# Patient Record
Sex: FEMALE | Race: WHITE | HISPANIC OR LATINO
[De-identification: names, ages, dates, MRNs, and addresses within clinical notes are randomized per-mention and may not be internally consistent; named-entity substitution may affect disease eponyms.]

---

## 2017-01-04 ENCOUNTER — APPOINTMENT (OUTPATIENT)
Dept: ORTHOPEDIC SURGERY | Facility: CLINIC | Age: 47
End: 2017-01-04

## 2017-02-27 ENCOUNTER — APPOINTMENT (OUTPATIENT)
Dept: MAMMOGRAPHY | Facility: CLINIC | Age: 47
End: 2017-02-27

## 2017-02-27 ENCOUNTER — APPOINTMENT (OUTPATIENT)
Dept: ULTRASOUND IMAGING | Facility: CLINIC | Age: 47
End: 2017-02-27

## 2017-02-27 ENCOUNTER — OUTPATIENT (OUTPATIENT)
Dept: OUTPATIENT SERVICES | Facility: HOSPITAL | Age: 47
LOS: 1 days | End: 2017-02-27
Payer: COMMERCIAL

## 2017-02-27 DIAGNOSIS — Z98.89 OTHER SPECIFIED POSTPROCEDURAL STATES: Chronic | ICD-10-CM

## 2017-02-27 DIAGNOSIS — Z96.7 PRESENCE OF OTHER BONE AND TENDON IMPLANTS: Chronic | ICD-10-CM

## 2017-02-27 DIAGNOSIS — S42.409A UNSPECIFIED FRACTURE OF LOWER END OF UNSPECIFIED HUMERUS, INITIAL ENCOUNTER FOR CLOSED FRACTURE: Chronic | ICD-10-CM

## 2017-02-27 DIAGNOSIS — Z00.8 ENCOUNTER FOR OTHER GENERAL EXAMINATION: ICD-10-CM

## 2017-02-27 PROCEDURE — 77067 SCR MAMMO BI INCL CAD: CPT

## 2017-02-27 PROCEDURE — 76641 ULTRASOUND BREAST COMPLETE: CPT

## 2017-02-27 PROCEDURE — 77063 BREAST TOMOSYNTHESIS BI: CPT

## 2017-03-02 DIAGNOSIS — N60.12 DIFFUSE CYSTIC MASTOPATHY OF LEFT BREAST: ICD-10-CM

## 2017-03-02 DIAGNOSIS — Z12.31 ENCOUNTER FOR SCREENING MAMMOGRAM FOR MALIGNANT NEOPLASM OF BREAST: ICD-10-CM

## 2017-03-02 DIAGNOSIS — N60.11 DIFFUSE CYSTIC MASTOPATHY OF RIGHT BREAST: ICD-10-CM

## 2017-03-02 DIAGNOSIS — N63 UNSPECIFIED LUMP IN BREAST: ICD-10-CM

## 2017-03-08 ENCOUNTER — APPOINTMENT (OUTPATIENT)
Dept: ORTHOPEDIC SURGERY | Facility: CLINIC | Age: 47
End: 2017-03-08

## 2017-09-07 ENCOUNTER — APPOINTMENT (OUTPATIENT)
Dept: ORTHOPEDIC SURGERY | Facility: CLINIC | Age: 47
End: 2017-09-07
Payer: COMMERCIAL

## 2017-09-07 PROCEDURE — 73502 X-RAY EXAM HIP UNI 2-3 VIEWS: CPT | Mod: LT

## 2017-09-07 PROCEDURE — 99204 OFFICE O/P NEW MOD 45 MIN: CPT

## 2017-10-10 ENCOUNTER — APPOINTMENT (OUTPATIENT)
Dept: ORTHOPEDIC SURGERY | Facility: CLINIC | Age: 47
End: 2017-10-10
Payer: COMMERCIAL

## 2017-10-10 DIAGNOSIS — S42.341K DISPLACED SPIRAL FRACTURE OF SHAFT OF HUMERUS, RIGHT ARM, SUBSEQUENT ENCOUNTER FOR FRACTURE WITH NONUNION: ICD-10-CM

## 2017-10-10 DIAGNOSIS — G56.31 LESION OF RADIAL NERVE, RIGHT UPPER LIMB: ICD-10-CM

## 2017-10-10 PROCEDURE — 73060 X-RAY EXAM OF HUMERUS: CPT | Mod: RT

## 2017-10-10 PROCEDURE — 99213 OFFICE O/P EST LOW 20 MIN: CPT

## 2017-10-10 RX ORDER — LEVOTHYROXINE SODIUM 150 UG/1
150 TABLET ORAL
Qty: 90 | Refills: 0 | Status: ACTIVE | COMMUNITY
Start: 2017-09-26

## 2017-11-03 ENCOUNTER — APPOINTMENT (OUTPATIENT)
Dept: ORTHOPEDIC SURGERY | Facility: CLINIC | Age: 47
End: 2017-11-03
Payer: COMMERCIAL

## 2017-11-03 VITALS — WEIGHT: 165 LBS | BODY MASS INDEX: 27.49 KG/M2 | HEIGHT: 65 IN

## 2017-11-03 PROCEDURE — 99214 OFFICE O/P EST MOD 30 MIN: CPT

## 2017-11-09 ENCOUNTER — APPOINTMENT (OUTPATIENT)
Dept: MRI IMAGING | Facility: CLINIC | Age: 47
End: 2017-11-09
Payer: COMMERCIAL

## 2017-11-09 ENCOUNTER — OUTPATIENT (OUTPATIENT)
Dept: OUTPATIENT SERVICES | Facility: HOSPITAL | Age: 47
LOS: 1 days | End: 2017-11-09
Payer: COMMERCIAL

## 2017-11-09 DIAGNOSIS — Z98.89 OTHER SPECIFIED POSTPROCEDURAL STATES: Chronic | ICD-10-CM

## 2017-11-09 DIAGNOSIS — Z96.7 PRESENCE OF OTHER BONE AND TENDON IMPLANTS: Chronic | ICD-10-CM

## 2017-11-09 DIAGNOSIS — S42.409A UNSPECIFIED FRACTURE OF LOWER END OF UNSPECIFIED HUMERUS, INITIAL ENCOUNTER FOR CLOSED FRACTURE: Chronic | ICD-10-CM

## 2017-11-09 DIAGNOSIS — M16.12 UNILATERAL PRIMARY OSTEOARTHRITIS, LEFT HIP: ICD-10-CM

## 2017-11-09 PROCEDURE — 73721 MRI JNT OF LWR EXTRE W/O DYE: CPT | Mod: 26,RT

## 2017-11-09 PROCEDURE — 73721 MRI JNT OF LWR EXTRE W/O DYE: CPT | Mod: 26,LT

## 2017-11-09 PROCEDURE — 73721 MRI JNT OF LWR EXTRE W/O DYE: CPT

## 2017-12-07 ENCOUNTER — APPOINTMENT (OUTPATIENT)
Dept: ORTHOPEDIC SURGERY | Facility: CLINIC | Age: 47
End: 2017-12-07
Payer: COMMERCIAL

## 2017-12-07 VITALS — WEIGHT: 165 LBS | HEIGHT: 65 IN | BODY MASS INDEX: 27.49 KG/M2

## 2017-12-07 DIAGNOSIS — M16.12 UNILATERAL PRIMARY OSTEOARTHRITIS, LEFT HIP: ICD-10-CM

## 2017-12-07 PROCEDURE — 99215 OFFICE O/P EST HI 40 MIN: CPT

## 2018-01-02 ENCOUNTER — OUTPATIENT (OUTPATIENT)
Dept: OUTPATIENT SERVICES | Facility: HOSPITAL | Age: 48
LOS: 1 days | End: 2018-01-02
Payer: COMMERCIAL

## 2018-01-02 VITALS
SYSTOLIC BLOOD PRESSURE: 121 MMHG | HEIGHT: 65.5 IN | OXYGEN SATURATION: 99 % | TEMPERATURE: 98 F | RESPIRATION RATE: 16 BRPM | HEART RATE: 70 BPM | DIASTOLIC BLOOD PRESSURE: 81 MMHG | WEIGHT: 145.95 LBS

## 2018-01-02 DIAGNOSIS — Z01.818 ENCOUNTER FOR OTHER PREPROCEDURAL EXAMINATION: ICD-10-CM

## 2018-01-02 DIAGNOSIS — M16.12 UNILATERAL PRIMARY OSTEOARTHRITIS, LEFT HIP: ICD-10-CM

## 2018-01-02 DIAGNOSIS — Z98.89 OTHER SPECIFIED POSTPROCEDURAL STATES: Chronic | ICD-10-CM

## 2018-01-02 DIAGNOSIS — Z96.7 PRESENCE OF OTHER BONE AND TENDON IMPLANTS: Chronic | ICD-10-CM

## 2018-01-02 DIAGNOSIS — S42.402A UNSPECIFIED FRACTURE OF LOWER END OF LEFT HUMERUS, INITIAL ENCOUNTER FOR CLOSED FRACTURE: Chronic | ICD-10-CM

## 2018-01-02 DIAGNOSIS — S42.409A UNSPECIFIED FRACTURE OF LOWER END OF UNSPECIFIED HUMERUS, INITIAL ENCOUNTER FOR CLOSED FRACTURE: Chronic | ICD-10-CM

## 2018-01-02 LAB
BLD GP AB SCN SERPL QL: NEGATIVE — SIGNIFICANT CHANGE UP
HCT VFR BLD CALC: 37.7 % — SIGNIFICANT CHANGE UP (ref 34.5–45)
HGB BLD-MCNC: 12.2 G/DL — SIGNIFICANT CHANGE UP (ref 11.5–15.5)
MCHC RBC-ENTMCNC: 28.9 PG — SIGNIFICANT CHANGE UP (ref 27–34)
MCHC RBC-ENTMCNC: 32.4 GM/DL — SIGNIFICANT CHANGE UP (ref 32–36)
MCV RBC AUTO: 89.3 FL — SIGNIFICANT CHANGE UP (ref 80–100)
PLATELET # BLD AUTO: 261 K/UL — SIGNIFICANT CHANGE UP (ref 150–400)
RBC # BLD: 4.22 M/UL — SIGNIFICANT CHANGE UP (ref 3.8–5.2)
RBC # FLD: 13.5 % — SIGNIFICANT CHANGE UP (ref 10.3–14.5)
RH IG SCN BLD-IMP: POSITIVE — SIGNIFICANT CHANGE UP
WBC # BLD: 5.79 K/UL — SIGNIFICANT CHANGE UP (ref 3.8–10.5)
WBC # FLD AUTO: 5.79 K/UL — SIGNIFICANT CHANGE UP (ref 3.8–10.5)

## 2018-01-02 PROCEDURE — 87641 MR-STAPH DNA AMP PROBE: CPT

## 2018-01-02 PROCEDURE — 86901 BLOOD TYPING SEROLOGIC RH(D): CPT

## 2018-01-02 PROCEDURE — 86900 BLOOD TYPING SEROLOGIC ABO: CPT

## 2018-01-02 PROCEDURE — 86850 RBC ANTIBODY SCREEN: CPT

## 2018-01-02 PROCEDURE — 87640 STAPH A DNA AMP PROBE: CPT

## 2018-01-02 PROCEDURE — 85027 COMPLETE CBC AUTOMATED: CPT

## 2018-01-02 PROCEDURE — G0463: CPT

## 2018-01-02 RX ORDER — TRAMADOL HYDROCHLORIDE 50 MG/1
50 TABLET ORAL ONCE
Qty: 0 | Refills: 0 | Status: DISCONTINUED | OUTPATIENT
Start: 2018-01-09 | End: 2018-01-09

## 2018-01-02 RX ORDER — GABAPENTIN 400 MG/1
300 CAPSULE ORAL ONCE
Qty: 0 | Refills: 0 | Status: COMPLETED | OUTPATIENT
Start: 2018-01-09 | End: 2018-01-09

## 2018-01-02 RX ORDER — ACETAMINOPHEN 500 MG
975 TABLET ORAL ONCE
Qty: 0 | Refills: 0 | Status: COMPLETED | OUTPATIENT
Start: 2018-01-09 | End: 2018-01-09

## 2018-01-02 RX ORDER — PANTOPRAZOLE SODIUM 20 MG/1
40 TABLET, DELAYED RELEASE ORAL ONCE
Qty: 0 | Refills: 0 | Status: COMPLETED | OUTPATIENT
Start: 2018-01-09 | End: 2018-01-09

## 2018-01-02 RX ORDER — CEFAZOLIN SODIUM 1 G
2000 VIAL (EA) INJECTION ONCE
Qty: 0 | Refills: 0 | Status: DISCONTINUED | OUTPATIENT
Start: 2018-01-09 | End: 2018-01-10

## 2018-01-02 NOTE — H&P PST ADULT - PSH
Fracture of elbow  left repair   S/P     S/P ORIF (open reduction internal fixation) fracture  right humerus 2016 Elbow fracture, left  s/p pins   S/P     S/P ORIF (open reduction internal fixation) fracture  right humerus 2016 s/p pin removal 2016

## 2018-01-02 NOTE — H&P PST ADULT - NSANTHOSAYNRD_GEN_A_CORE
No. PENNY screening performed.  STOP BANG Legend: 0-2 = LOW Risk; 3-4 = INTERMEDIATE Risk; 5-8 = HIGH Risk

## 2018-01-02 NOTE — H&P PST ADULT - PMH
Closed nondisplaced spiral fracture of shaft of humerus    Dry eye syndrome    Hypothyroidism    Seasonal allergies Closed nondisplaced spiral fracture of shaft of humerus  s/p repair x2  Hypothyroidism  hx of goiter s/p Iodine treatment 2014  Primary osteoarthritis of left hip    Seasonal allergies

## 2018-01-02 NOTE — H&P PST ADULT - PRIMARY CARE PROVIDER
Dr. Sharon Harrington (pcp) [phone], fax [phone] Dr. Sharon Harrington ([facility]) [phone]

## 2018-01-02 NOTE — H&P PST ADULT - RS GEN PE MLT RESP DETAILS PC
no rhonchi/no wheezes/breath sounds equal/good air movement/no rales/respirations non-labored/clear to auscultation bilaterally/no chest wall tenderness

## 2018-01-02 NOTE — H&P PST ADULT - HISTORY OF PRESENT ILLNESS
45 y/o female with hx of hypothyroidism, right humerus fracture, s/p ORIF 8/2016 presents c/o right radial nerve palsy since the surgery in August. Patient states, s/p EMG x 2 which showed, no progression of return of her radial nerve symptoms. Patient is scheduled for neurolysis right radial neck removal of hardware right arm on 11/11/2016. 47 year old female with PMH of hypothyroidism with compliant of left hip osteoarthritis pain planned for left anterior total hip replacement.

## 2018-01-02 NOTE — H&P PST ADULT - GASTROINTESTINAL DETAILS
no guarding/no bruit/no rebound tenderness/soft/nontender/no masses palpable/bowel sounds normal/no distention no rebound tenderness/bowel sounds normal/soft/nontender/no masses palpable/no distention

## 2018-01-02 NOTE — H&P PST ADULT - NEUROLOGICAL DETAILS
preop dx lesion of radial nerve, unspecified upper limb/responds to verbal commands/alert and oriented x 3 alert and oriented x 3/no spontaneous movement

## 2018-01-02 NOTE — H&P PST ADULT - NEGATIVE GASTROINTESTINAL SYMPTOMS
no nausea/no diarrhea/no hematochezia/no constipation/no abdominal pain/no melena/no vomiting no melena/no abdominal pain/no hematochezia/no vomiting/no nausea

## 2018-01-02 NOTE — H&P PST ADULT - FAMILY HISTORY
Mother  Still living? Yes, Estimated age: Age Unknown  Family history of diabetes mellitus, Age at diagnosis: Age Unknown  Family history of hypertension, Age at diagnosis: Age Unknown  Family history of hypothyroidism, Age at diagnosis: Age Unknown  Family history of hypercholesterolemia, Age at diagnosis: Age Unknown     Father  Still living? Yes, Estimated age: Age Unknown  Family history of glaucoma, Age at diagnosis: Age Unknown  Family history of renal cancer, Age at diagnosis: Age Unknown

## 2018-01-02 NOTE — H&P PST ADULT - PROBLEM SELECTOR PLAN 1
planned for left anterior total hip replacement.   PST labs, MRSA send  Preprocedure surgical scrub instructions discussed

## 2018-01-03 LAB
MRSA PCR RESULT.: SIGNIFICANT CHANGE UP
S AUREUS DNA NOSE QL NAA+PROBE: SIGNIFICANT CHANGE UP

## 2018-01-09 ENCOUNTER — RESULT REVIEW (OUTPATIENT)
Age: 48
End: 2018-01-09

## 2018-01-09 ENCOUNTER — INPATIENT (INPATIENT)
Facility: HOSPITAL | Age: 48
LOS: 0 days | Discharge: HOME | DRG: 470 | End: 2018-01-10
Attending: ORTHOPAEDIC SURGERY | Admitting: ORTHOPAEDIC SURGERY
Payer: COMMERCIAL

## 2018-01-09 ENCOUNTER — APPOINTMENT (OUTPATIENT)
Dept: ORTHOPEDIC SURGERY | Facility: HOSPITAL | Age: 48
End: 2018-01-09

## 2018-01-09 VITALS
WEIGHT: 145.95 LBS | TEMPERATURE: 98 F | OXYGEN SATURATION: 99 % | RESPIRATION RATE: 18 BRPM | HEART RATE: 67 BPM | HEIGHT: 65.5 IN | DIASTOLIC BLOOD PRESSURE: 72 MMHG | SYSTOLIC BLOOD PRESSURE: 108 MMHG

## 2018-01-09 DIAGNOSIS — M16.12 UNILATERAL PRIMARY OSTEOARTHRITIS, LEFT HIP: ICD-10-CM

## 2018-01-09 DIAGNOSIS — Z96.7 PRESENCE OF OTHER BONE AND TENDON IMPLANTS: Chronic | ICD-10-CM

## 2018-01-09 DIAGNOSIS — Z98.89 OTHER SPECIFIED POSTPROCEDURAL STATES: Chronic | ICD-10-CM

## 2018-01-09 DIAGNOSIS — S42.402A UNSPECIFIED FRACTURE OF LOWER END OF LEFT HUMERUS, INITIAL ENCOUNTER FOR CLOSED FRACTURE: Chronic | ICD-10-CM

## 2018-01-09 LAB
ANION GAP SERPL CALC-SCNC: 10 MMOL/L — SIGNIFICANT CHANGE UP (ref 5–17)
BUN SERPL-MCNC: 10 MG/DL — SIGNIFICANT CHANGE UP (ref 7–23)
CALCIUM SERPL-MCNC: 8.9 MG/DL — SIGNIFICANT CHANGE UP (ref 8.4–10.5)
CHLORIDE SERPL-SCNC: 102 MMOL/L — SIGNIFICANT CHANGE UP (ref 96–108)
CO2 SERPL-SCNC: 29 MMOL/L — SIGNIFICANT CHANGE UP (ref 22–31)
CREAT SERPL-MCNC: 0.79 MG/DL — SIGNIFICANT CHANGE UP (ref 0.5–1.3)
GLUCOSE SERPL-MCNC: 116 MG/DL — HIGH (ref 70–99)
HCG UR QL: NEGATIVE — SIGNIFICANT CHANGE UP
HCT VFR BLD CALC: 36.3 % — SIGNIFICANT CHANGE UP (ref 34.5–45)
HGB BLD-MCNC: 12.3 G/DL — SIGNIFICANT CHANGE UP (ref 11.5–15.5)
MCHC RBC-ENTMCNC: 31.2 PG — SIGNIFICANT CHANGE UP (ref 27–34)
MCHC RBC-ENTMCNC: 33.9 GM/DL — SIGNIFICANT CHANGE UP (ref 32–36)
MCV RBC AUTO: 91.9 FL — SIGNIFICANT CHANGE UP (ref 80–100)
PLATELET # BLD AUTO: 225 K/UL — SIGNIFICANT CHANGE UP (ref 150–400)
POTASSIUM SERPL-MCNC: 4.4 MMOL/L — SIGNIFICANT CHANGE UP (ref 3.5–5.3)
POTASSIUM SERPL-SCNC: 4.4 MMOL/L — SIGNIFICANT CHANGE UP (ref 3.5–5.3)
RBC # BLD: 3.95 M/UL — SIGNIFICANT CHANGE UP (ref 3.8–5.2)
RBC # FLD: 11.5 % — SIGNIFICANT CHANGE UP (ref 10.3–14.5)
RH IG SCN BLD-IMP: POSITIVE — SIGNIFICANT CHANGE UP
SODIUM SERPL-SCNC: 141 MMOL/L — SIGNIFICANT CHANGE UP (ref 135–145)
WBC # BLD: 7 K/UL — SIGNIFICANT CHANGE UP (ref 3.8–10.5)
WBC # FLD AUTO: 7 K/UL — SIGNIFICANT CHANGE UP (ref 3.8–10.5)

## 2018-01-09 PROCEDURE — 27130 TOTAL HIP ARTHROPLASTY: CPT | Mod: LT

## 2018-01-09 PROCEDURE — 88305 TISSUE EXAM BY PATHOLOGIST: CPT | Mod: 26

## 2018-01-09 PROCEDURE — 88311 DECALCIFY TISSUE: CPT | Mod: 26

## 2018-01-09 PROCEDURE — 72170 X-RAY EXAM OF PELVIS: CPT | Mod: 26

## 2018-01-09 RX ORDER — OXYCODONE HYDROCHLORIDE 5 MG/1
10 TABLET ORAL EVERY 4 HOURS
Qty: 0 | Refills: 0 | Status: DISCONTINUED | OUTPATIENT
Start: 2018-01-09 | End: 2018-01-10

## 2018-01-09 RX ORDER — KETOROLAC TROMETHAMINE 30 MG/ML
15 SYRINGE (ML) INJECTION EVERY 8 HOURS
Qty: 0 | Refills: 0 | Status: DISCONTINUED | OUTPATIENT
Start: 2018-01-09 | End: 2018-01-10

## 2018-01-09 RX ORDER — INFLUENZA VIRUS VACCINE 15; 15; 15; 15 UG/.5ML; UG/.5ML; UG/.5ML; UG/.5ML
0.5 SUSPENSION INTRAMUSCULAR ONCE
Qty: 0 | Refills: 0 | Status: DISCONTINUED | OUTPATIENT
Start: 2018-01-09 | End: 2018-01-10

## 2018-01-09 RX ORDER — SODIUM CHLORIDE 9 MG/ML
3 INJECTION INTRAMUSCULAR; INTRAVENOUS; SUBCUTANEOUS EVERY 8 HOURS
Qty: 0 | Refills: 0 | Status: DISCONTINUED | OUTPATIENT
Start: 2018-01-09 | End: 2018-01-09

## 2018-01-09 RX ORDER — CEFAZOLIN SODIUM 1 G
2000 VIAL (EA) INJECTION EVERY 8 HOURS
Qty: 0 | Refills: 0 | Status: COMPLETED | OUTPATIENT
Start: 2018-01-09 | End: 2018-01-10

## 2018-01-09 RX ORDER — LEVOTHYROXINE SODIUM 125 MCG
150 TABLET ORAL DAILY
Qty: 0 | Refills: 0 | Status: DISCONTINUED | OUTPATIENT
Start: 2018-01-09 | End: 2018-01-10

## 2018-01-09 RX ORDER — ONDANSETRON 8 MG/1
4 TABLET, FILM COATED ORAL EVERY 6 HOURS
Qty: 0 | Refills: 0 | Status: DISCONTINUED | OUTPATIENT
Start: 2018-01-09 | End: 2018-01-10

## 2018-01-09 RX ORDER — DOCUSATE SODIUM 100 MG
100 CAPSULE ORAL
Qty: 0 | Refills: 0 | Status: DISCONTINUED | OUTPATIENT
Start: 2018-01-09 | End: 2018-01-10

## 2018-01-09 RX ORDER — ACETAMINOPHEN 500 MG
650 TABLET ORAL EVERY 6 HOURS
Qty: 0 | Refills: 0 | Status: COMPLETED | OUTPATIENT
Start: 2018-01-09 | End: 2018-01-10

## 2018-01-09 RX ORDER — ASPIRIN/CALCIUM CARB/MAGNESIUM 324 MG
325 TABLET ORAL
Qty: 0 | Refills: 0 | Status: DISCONTINUED | OUTPATIENT
Start: 2018-01-10 | End: 2018-01-10

## 2018-01-09 RX ORDER — HYDROMORPHONE HYDROCHLORIDE 2 MG/ML
0.5 INJECTION INTRAMUSCULAR; INTRAVENOUS; SUBCUTANEOUS
Qty: 0 | Refills: 0 | Status: DISCONTINUED | OUTPATIENT
Start: 2018-01-09 | End: 2018-01-09

## 2018-01-09 RX ORDER — DOCUSATE SODIUM 100 MG
1 CAPSULE ORAL
Qty: 0 | Refills: 0 | COMMUNITY

## 2018-01-09 RX ORDER — OXYCODONE HYDROCHLORIDE 5 MG/1
5 TABLET ORAL EVERY 4 HOURS
Qty: 0 | Refills: 0 | Status: DISCONTINUED | OUTPATIENT
Start: 2018-01-09 | End: 2018-01-10

## 2018-01-09 RX ORDER — SODIUM CHLORIDE 9 MG/ML
1000 INJECTION, SOLUTION INTRAVENOUS
Qty: 0 | Refills: 0 | Status: DISCONTINUED | OUTPATIENT
Start: 2018-01-09 | End: 2018-01-10

## 2018-01-09 RX ORDER — SODIUM CHLORIDE 9 MG/ML
1000 INJECTION INTRAMUSCULAR; INTRAVENOUS; SUBCUTANEOUS ONCE
Qty: 0 | Refills: 0 | Status: COMPLETED | OUTPATIENT
Start: 2018-01-09 | End: 2018-01-09

## 2018-01-09 RX ORDER — MAGNESIUM HYDROXIDE 400 MG/1
30 TABLET, CHEWABLE ORAL DAILY
Qty: 0 | Refills: 0 | Status: DISCONTINUED | OUTPATIENT
Start: 2018-01-09 | End: 2018-01-10

## 2018-01-09 RX ORDER — HYDROMORPHONE HYDROCHLORIDE 2 MG/ML
0.5 INJECTION INTRAMUSCULAR; INTRAVENOUS; SUBCUTANEOUS EVERY 4 HOURS
Qty: 0 | Refills: 0 | Status: DISCONTINUED | OUTPATIENT
Start: 2018-01-09 | End: 2018-01-10

## 2018-01-09 RX ORDER — POLYETHYLENE GLYCOL 3350 17 G/17G
17 POWDER, FOR SOLUTION ORAL DAILY
Qty: 0 | Refills: 0 | Status: DISCONTINUED | OUTPATIENT
Start: 2018-01-09 | End: 2018-01-10

## 2018-01-09 RX ORDER — LIDOCAINE HCL 20 MG/ML
0.2 VIAL (ML) INJECTION ONCE
Qty: 0 | Refills: 0 | Status: DISCONTINUED | OUTPATIENT
Start: 2018-01-09 | End: 2018-01-09

## 2018-01-09 RX ORDER — SENNA PLUS 8.6 MG/1
2 TABLET ORAL AT BEDTIME
Qty: 0 | Refills: 0 | Status: DISCONTINUED | OUTPATIENT
Start: 2018-01-09 | End: 2018-01-10

## 2018-01-09 RX ADMIN — Medication 15 MILLIGRAM(S): at 21:28

## 2018-01-09 RX ADMIN — Medication 100 MILLIGRAM(S): at 20:57

## 2018-01-09 RX ADMIN — Medication 100 MILLIGRAM(S): at 17:50

## 2018-01-09 RX ADMIN — SODIUM CHLORIDE 1000 MILLILITER(S): 9 INJECTION INTRAMUSCULAR; INTRAVENOUS; SUBCUTANEOUS at 18:49

## 2018-01-09 RX ADMIN — Medication 650 MILLIGRAM(S): at 17:50

## 2018-01-09 RX ADMIN — PANTOPRAZOLE SODIUM 40 MILLIGRAM(S): 20 TABLET, DELAYED RELEASE ORAL at 11:00

## 2018-01-09 RX ADMIN — GABAPENTIN 300 MILLIGRAM(S): 400 CAPSULE ORAL at 11:00

## 2018-01-09 RX ADMIN — Medication 15 MILLIGRAM(S): at 20:58

## 2018-01-09 RX ADMIN — TRAMADOL HYDROCHLORIDE 50 MILLIGRAM(S): 50 TABLET ORAL at 11:19

## 2018-01-09 RX ADMIN — Medication 975 MILLIGRAM(S): at 11:00

## 2018-01-09 NOTE — PHYSICAL THERAPY INITIAL EVALUATION ADULT - ACTIVE RANGE OF MOTION EXAMINATION, REHAB EVAL
bilateral upper extremity Active ROM was WFL (within functional limits)/Right LE Active ROM was WFL (within functional limits)/left hip limitations s/p THR

## 2018-01-09 NOTE — PATIENT PROFILE ADULT. - PMH
Closed nondisplaced spiral fracture of shaft of humerus  s/p repair x2  Hypothyroidism  hx of goiter s/p Iodine treatment 2014  Primary osteoarthritis of left hip    Seasonal allergies

## 2018-01-09 NOTE — PHYSICAL THERAPY INITIAL EVALUATION ADULT - MANUAL MUSCLE TESTING RESULTS, REHAB EVAL
Left hip not assessed; good quad set/glut set; 5/5 at knee and ankle; bilateral UE 5/5; RLE 5/5/grossly assessed due to

## 2018-01-09 NOTE — PATIENT PROFILE ADULT. - PSH
Elbow fracture, left  s/p pins   S/P     S/P ORIF (open reduction internal fixation) fracture  right humerus 2016 s/p pin removal 2016

## 2018-01-09 NOTE — PHYSICAL THERAPY INITIAL EVALUATION ADULT - PERTINENT HX OF CURRENT PROBLEM, REHAB EVAL
47 year old female with PMH of hypothyroidism w/ compliant of left hip osteoarthritis pain planned for left anterior total hip replacement, underwent as scheduled on 1/9/17.

## 2018-01-09 NOTE — CHART NOTE - NSCHARTNOTEFT_GEN_A_CORE
Patient is a 47y old  Female who presents with a chief complaint of left hip replacement (09 Jan 2018 11:08)    Resting without complaints.  No Chest Pain, SOB, N/V.    T(C): 36.2 (01-09-18 @ 16:00), Max: 36.5 (01-09-18 @ 11:05)  HR: 78 (01-09-18 @ 16:30) (67 - 92)  BP: 115/66 (01-09-18 @ 16:30) (104/55 - 146/88)  RR: 16 (01-09-18 @ 16:30) (15 - 18)  SpO2: 97% (01-09-18 @ 16:30) (86% - 99%)    Exam:   Alert and Oriented; No Acute Distress  LLE: Aquacell dressing C/D/I. Sensation grossly intact to light touch.  Calves soft, non-tender bilaterally  (+) PF/DF  (+) Distal pulses    Xray: Good alignment of hardware                          12.3   7.0   )-----------( 225      ( 09 Jan 2018 16:19 )             36.3    01-09    141  |  102  |  10  ----------------------------<  116<H>  4.4   |  29  |  0.79    Ca    8.9      09 Jan 2018 16:19    Patient is a 47y old  Female who presents with a chief complaint of left hip replacement (09 Jan 2018 11:08)   s/p Left RUPA (AA). Pt stable    Plan:  - Pain Control   - DVT ppx  - WBAT LLE/OOB/anterior precautions  - Venodynes/IS  - Continue current tx    Gualberto Henderson PA-C  Orthopedic Surgery  [phone]  [phone]

## 2018-01-10 ENCOUNTER — TRANSCRIPTION ENCOUNTER (OUTPATIENT)
Age: 48
End: 2018-01-10

## 2018-01-10 VITALS — OXYGEN SATURATION: 99 % | RESPIRATION RATE: 18 BRPM | TEMPERATURE: 99 F

## 2018-01-10 LAB
ANION GAP SERPL CALC-SCNC: 10 MMOL/L — SIGNIFICANT CHANGE UP (ref 5–17)
BUN SERPL-MCNC: 12 MG/DL — SIGNIFICANT CHANGE UP (ref 7–23)
CALCIUM SERPL-MCNC: 8.7 MG/DL — SIGNIFICANT CHANGE UP (ref 8.4–10.5)
CHLORIDE SERPL-SCNC: 100 MMOL/L — SIGNIFICANT CHANGE UP (ref 96–108)
CO2 SERPL-SCNC: 27 MMOL/L — SIGNIFICANT CHANGE UP (ref 22–31)
CREAT SERPL-MCNC: 0.81 MG/DL — SIGNIFICANT CHANGE UP (ref 0.5–1.3)
GLUCOSE SERPL-MCNC: 98 MG/DL — SIGNIFICANT CHANGE UP (ref 70–99)
HCT VFR BLD CALC: 31.2 % — LOW (ref 34.5–45)
HGB BLD-MCNC: 10.4 G/DL — LOW (ref 11.5–15.5)
MCHC RBC-ENTMCNC: 30.5 PG — SIGNIFICANT CHANGE UP (ref 27–34)
MCHC RBC-ENTMCNC: 33.2 GM/DL — SIGNIFICANT CHANGE UP (ref 32–36)
MCV RBC AUTO: 91.7 FL — SIGNIFICANT CHANGE UP (ref 80–100)
PLATELET # BLD AUTO: 216 K/UL — SIGNIFICANT CHANGE UP (ref 150–400)
POTASSIUM SERPL-MCNC: 4.2 MMOL/L — SIGNIFICANT CHANGE UP (ref 3.5–5.3)
POTASSIUM SERPL-SCNC: 4.2 MMOL/L — SIGNIFICANT CHANGE UP (ref 3.5–5.3)
RBC # BLD: 3.4 M/UL — LOW (ref 3.8–5.2)
RBC # FLD: 11.4 % — SIGNIFICANT CHANGE UP (ref 10.3–14.5)
SODIUM SERPL-SCNC: 137 MMOL/L — SIGNIFICANT CHANGE UP (ref 135–145)
WBC # BLD: 8.5 K/UL — SIGNIFICANT CHANGE UP (ref 3.8–10.5)
WBC # FLD AUTO: 8.5 K/UL — SIGNIFICANT CHANGE UP (ref 3.8–10.5)

## 2018-01-10 PROCEDURE — C1713: CPT

## 2018-01-10 PROCEDURE — 97116 GAIT TRAINING THERAPY: CPT

## 2018-01-10 PROCEDURE — 88305 TISSUE EXAM BY PATHOLOGIST: CPT

## 2018-01-10 PROCEDURE — 80048 BASIC METABOLIC PNL TOTAL CA: CPT

## 2018-01-10 PROCEDURE — 97165 OT EVAL LOW COMPLEX 30 MIN: CPT

## 2018-01-10 PROCEDURE — C1889: CPT

## 2018-01-10 PROCEDURE — 97530 THERAPEUTIC ACTIVITIES: CPT

## 2018-01-10 PROCEDURE — C1776: CPT

## 2018-01-10 PROCEDURE — 86901 BLOOD TYPING SEROLOGIC RH(D): CPT

## 2018-01-10 PROCEDURE — 72170 X-RAY EXAM OF PELVIS: CPT

## 2018-01-10 PROCEDURE — 81025 URINE PREGNANCY TEST: CPT

## 2018-01-10 PROCEDURE — 85027 COMPLETE CBC AUTOMATED: CPT

## 2018-01-10 PROCEDURE — 86900 BLOOD TYPING SEROLOGIC ABO: CPT

## 2018-01-10 PROCEDURE — 88311 DECALCIFY TISSUE: CPT

## 2018-01-10 PROCEDURE — 97161 PT EVAL LOW COMPLEX 20 MIN: CPT

## 2018-01-10 PROCEDURE — 76000 FLUOROSCOPY <1 HR PHYS/QHP: CPT

## 2018-01-10 RX ORDER — OXYCODONE HYDROCHLORIDE 5 MG/1
1 TABLET ORAL
Qty: 40 | Refills: 0 | OUTPATIENT
Start: 2018-01-10

## 2018-01-10 RX ORDER — ASPIRIN/CALCIUM CARB/MAGNESIUM 324 MG
1 TABLET ORAL
Qty: 60 | Refills: 0 | OUTPATIENT
Start: 2018-01-10 | End: 2018-02-08

## 2018-01-10 RX ORDER — DOCUSATE SODIUM 100 MG
100 CAPSULE ORAL THREE TIMES A DAY
Qty: 0 | Refills: 0 | Status: DISCONTINUED | OUTPATIENT
Start: 2018-01-10 | End: 2018-01-10

## 2018-01-10 RX ADMIN — Medication 650 MILLIGRAM(S): at 11:13

## 2018-01-10 RX ADMIN — Medication 650 MILLIGRAM(S): at 00:42

## 2018-01-10 RX ADMIN — Medication 650 MILLIGRAM(S): at 06:10

## 2018-01-10 RX ADMIN — Medication 325 MILLIGRAM(S): at 06:11

## 2018-01-10 RX ADMIN — Medication 100 MILLIGRAM(S): at 06:11

## 2018-01-10 RX ADMIN — Medication 15 MILLIGRAM(S): at 06:41

## 2018-01-10 RX ADMIN — Medication 15 MILLIGRAM(S): at 06:11

## 2018-01-10 RX ADMIN — Medication 15 MILLIGRAM(S): at 13:25

## 2018-01-10 RX ADMIN — Medication 150 MICROGRAM(S): at 06:11

## 2018-01-10 NOTE — DISCHARGE NOTE ADULT - NS AS ACTIVITY OBS
Showering allowed/Driving allowed/Walking-Indoors allowed/keep dressing clean and dry/Stairs allowed/No Heavy lifting/straining/Walking-Outdoors allowed

## 2018-01-10 NOTE — DISCHARGE NOTE ADULT - PLAN OF CARE
Resume ADLs pain control  DVT prophylaxis (aspirin) for 30 days  rest, ice  Weight bearing as tolerated with assistive devices as needed  follow up with Dr. Moon 1-2 weeks after discharge from hospital  Please call office to schedule follow up appointment.

## 2018-01-10 NOTE — OCCUPATIONAL THERAPY INITIAL EVALUATION ADULT - LIVES WITH, PROFILE
Lives in split level house with  and son, 2 small steps to enter, 5 steps to bed/bath +handrail/spouse/children

## 2018-01-10 NOTE — DISCHARGE NOTE ADULT - PATIENT PORTAL LINK FT
“You can access the Follow[facility] Patient Portal, offered by [facility], by registering with the following website: [email or web address]”

## 2018-01-10 NOTE — DISCHARGE NOTE ADULT - MEDICATION SUMMARY - MEDICATIONS TO TAKE
I will START or STAY ON the medications listed below when I get home from the hospital:    multiple vitamins  -- orally once a day  -- Indication: For home med    acetaminophen-oxyCODONE 325 mg-5 mg oral tablet  -- 1 tab(s) by mouth 4 times a day, As Needed -for severe pain MDD:6  -- Caution federal law prohibits the transfer of this drug to any person other  than the person for whom it was prescribed.  May cause drowsiness.  Alcohol may intensify this effect.  Use care when operating dangerous machinery.  This prescription cannot be refilled.  This product contains acetaminophen.  Do not use  with any other product containing acetaminophen to prevent possible liver damage.  Using more of this medication than prescribed may cause serious breathing problems.    -- Indication: For Pain med    Aspirin Enteric Coated 325 mg oral delayed release tablet  -- 1 tab(s) by mouth 2 times a day   -- Swallow whole.  Do not crush.  Take with food or milk.    -- Indication: For dvt ppx    Colace 100 mg oral capsule  -- 1 cap(s) by mouth 2 times a day  -- Indication: For home med    levothyroxine 150 mcg (0.15 mg) oral tablet  -- 1 tab(s) by mouth once a day in am  -- Indication: For home med

## 2018-01-10 NOTE — DISCHARGE NOTE ADULT - CONDITIONS AT DISCHARGE
stable, tolerating diet, ambulating with walker in [place], voiding well. piv removed. left hip incision clean dry and intact.

## 2018-01-10 NOTE — PROGRESS NOTE ADULT - SUBJECTIVE AND OBJECTIVE BOX
Pt S/E at bedside, no acute events overnight, pain controlled    AVSS  Gen: NAD, AAOx3    Left Lower Extremity:  Dressing clean dry intact  +EHL/FHL/TA/GS  SILT L3-S1  +DP/PT Pulses  Compartments soft  No calf TTP B/L

## 2018-01-10 NOTE — DISCHARGE NOTE ADULT - CARE PLAN
Principal Discharge DX:	Primary osteoarthritis of left hip  Goal:	Resume ADLs  Instructions for follow-up, activity and diet:	pain control  DVT prophylaxis (aspirin) for 30 days  rest, ice  Weight bearing as tolerated with assistive devices as needed  follow up with Dr. Moon 1-2 weeks after discharge from hospital  Please call office to schedule follow up appointment.

## 2018-01-10 NOTE — DISCHARGE NOTE ADULT - HOSPITAL COURSE
47F admitted to [facility] for elective left total hip arthroplasty. She was brought to the operating room on the above mentioned date. She tolerated the procedure well without complication. She was then transferred to the recovery room in stable condition. She underwent daily physical therapy and daily labs were monitored. The rest of her hospital stay was unremarkable. She was discharged home in stable condition.

## 2018-01-12 LAB — SURGICAL PATHOLOGY STUDY: SIGNIFICANT CHANGE UP

## 2018-01-19 ENCOUNTER — APPOINTMENT (OUTPATIENT)
Dept: ORTHOPEDIC SURGERY | Facility: CLINIC | Age: 48
End: 2018-01-19
Payer: COMMERCIAL

## 2018-01-19 PROCEDURE — 73502 X-RAY EXAM HIP UNI 2-3 VIEWS: CPT | Mod: LT

## 2018-01-19 PROCEDURE — 99024 POSTOP FOLLOW-UP VISIT: CPT

## 2018-02-05 NOTE — H&P PST ADULT - PRO INTERPRETER NEED 2
Detail Level: Zone Quality 137: Melanoma: Continuity Of Care - Recall System: Patient information entered into a recall system that includes: target date for the next exam specified AND a process to follow up with patients regarding missed or unscheduled appointments Quality 224: Stage 0-Iic Melanoma: Overutilization Of Imaging Studies For Only Stage 0-Iic Melanoma: None of the following diagnostic imaging studies ordered: chest X-ray, CT, Ultrasound, MRI, PET, or nuclear medicine scans (ML) Detail Level: Simple Include Location In Plan?: No Additional Note: Will treat when symptomatic English

## 2018-03-08 ENCOUNTER — APPOINTMENT (OUTPATIENT)
Dept: MAMMOGRAPHY | Facility: CLINIC | Age: 48
End: 2018-03-08
Payer: COMMERCIAL

## 2018-03-08 ENCOUNTER — OUTPATIENT (OUTPATIENT)
Dept: OUTPATIENT SERVICES | Facility: HOSPITAL | Age: 48
LOS: 1 days | End: 2018-03-08
Payer: COMMERCIAL

## 2018-03-08 ENCOUNTER — APPOINTMENT (OUTPATIENT)
Dept: ULTRASOUND IMAGING | Facility: CLINIC | Age: 48
End: 2018-03-08
Payer: COMMERCIAL

## 2018-03-08 DIAGNOSIS — Z00.8 ENCOUNTER FOR OTHER GENERAL EXAMINATION: ICD-10-CM

## 2018-03-08 DIAGNOSIS — Z98.89 OTHER SPECIFIED POSTPROCEDURAL STATES: Chronic | ICD-10-CM

## 2018-03-08 DIAGNOSIS — Z96.7 PRESENCE OF OTHER BONE AND TENDON IMPLANTS: Chronic | ICD-10-CM

## 2018-03-08 DIAGNOSIS — S42.402A UNSPECIFIED FRACTURE OF LOWER END OF LEFT HUMERUS, INITIAL ENCOUNTER FOR CLOSED FRACTURE: Chronic | ICD-10-CM

## 2018-03-08 PROCEDURE — 77067 SCR MAMMO BI INCL CAD: CPT

## 2018-03-08 PROCEDURE — 77063 BREAST TOMOSYNTHESIS BI: CPT | Mod: 26

## 2018-03-08 PROCEDURE — 76641 ULTRASOUND BREAST COMPLETE: CPT | Mod: 26,50

## 2018-03-08 PROCEDURE — 77063 BREAST TOMOSYNTHESIS BI: CPT

## 2018-03-08 PROCEDURE — 76641 ULTRASOUND BREAST COMPLETE: CPT

## 2018-03-08 PROCEDURE — 77067 SCR MAMMO BI INCL CAD: CPT | Mod: 26

## 2018-04-19 ENCOUNTER — APPOINTMENT (OUTPATIENT)
Dept: ORTHOPEDIC SURGERY | Facility: CLINIC | Age: 48
End: 2018-04-19
Payer: COMMERCIAL

## 2018-04-19 VITALS — HEIGHT: 65 IN | BODY MASS INDEX: 27.49 KG/M2 | WEIGHT: 165 LBS

## 2018-04-19 DIAGNOSIS — Z96.642 PRESENCE OF LEFT ARTIFICIAL HIP JOINT: ICD-10-CM

## 2018-04-19 PROCEDURE — 73521 X-RAY EXAM HIPS BI 2 VIEWS: CPT

## 2018-04-19 PROCEDURE — 99215 OFFICE O/P EST HI 40 MIN: CPT

## 2018-12-09 NOTE — H&P PST ADULT - GENERAL
Telephone Encounter by Sissy Tyler MD at 03/21/17 03:27 PM     Author:  Sissy Tyler MD Service:  (none) Author Type:  Physician     Filed:  03/21/17 03:30 PM Encounter Date:  3/21/2017 Status:  Signed     :  Sissy Tyler MD (Physician)            The vomiting is likley post-tussive, from all of the coughing. I don't prescribe rx cough meds to kids this young. We could try a short course of an oral steroid to see if that opens her up a little. I sometimes rx zofran for n/v, but I really don't think this will help in her case since the trigger is the coughing. If omm wants to try a little zofran, we can prescribe 4 mg/5 mL - 1/2 tsp q6 hrs prn n/v. If she is open to trying the oral steroid, we can try orapred 15 mg/5 mL - 6 mL once a day for 5 days.[AS1.1M]       Revision History        User Key Date/Time User Provider Type Action    > AS1.1 03/21/17 03:30 PM Sissy Tyler MD Physician Sign    M - Manual             negative

## 2019-03-13 PROBLEM — M16.12 UNILATERAL PRIMARY OSTEOARTHRITIS, LEFT HIP: Chronic | Status: ACTIVE | Noted: 2018-01-02

## 2019-04-04 ENCOUNTER — APPOINTMENT (OUTPATIENT)
Dept: ULTRASOUND IMAGING | Facility: CLINIC | Age: 49
End: 2019-04-04
Payer: COMMERCIAL

## 2019-04-04 ENCOUNTER — OUTPATIENT (OUTPATIENT)
Dept: OUTPATIENT SERVICES | Facility: HOSPITAL | Age: 49
LOS: 1 days | End: 2019-04-04
Payer: COMMERCIAL

## 2019-04-04 ENCOUNTER — APPOINTMENT (OUTPATIENT)
Dept: MAMMOGRAPHY | Facility: CLINIC | Age: 49
End: 2019-04-04
Payer: COMMERCIAL

## 2019-04-04 DIAGNOSIS — Z00.8 ENCOUNTER FOR OTHER GENERAL EXAMINATION: ICD-10-CM

## 2019-04-04 DIAGNOSIS — S42.402A UNSPECIFIED FRACTURE OF LOWER END OF LEFT HUMERUS, INITIAL ENCOUNTER FOR CLOSED FRACTURE: Chronic | ICD-10-CM

## 2019-04-04 DIAGNOSIS — Z98.89 OTHER SPECIFIED POSTPROCEDURAL STATES: Chronic | ICD-10-CM

## 2019-04-04 DIAGNOSIS — Z96.7 PRESENCE OF OTHER BONE AND TENDON IMPLANTS: Chronic | ICD-10-CM

## 2019-04-04 PROCEDURE — 77067 SCR MAMMO BI INCL CAD: CPT | Mod: 26

## 2019-04-04 PROCEDURE — 76641 ULTRASOUND BREAST COMPLETE: CPT

## 2019-04-04 PROCEDURE — 77067 SCR MAMMO BI INCL CAD: CPT

## 2019-04-04 PROCEDURE — 76641 ULTRASOUND BREAST COMPLETE: CPT | Mod: 26,50

## 2019-04-04 PROCEDURE — 77063 BREAST TOMOSYNTHESIS BI: CPT | Mod: 26

## 2019-04-04 PROCEDURE — 77063 BREAST TOMOSYNTHESIS BI: CPT

## 2019-12-24 NOTE — PHYSICAL THERAPY INITIAL EVALUATION ADULT - PHYSICAL ASSIST/NONPHYSICAL ASSIST: SIT/STAND, REHAB EVAL
Problem: Safety  Goal: Will remain free from injury  Outcome: PROGRESSING AS EXPECTED     Problem: Knowledge Deficit  Goal: Knowledge of disease process/condition, treatment plan, diagnostic tests, and medications will improve  Outcome: PROGRESSING AS EXPECTED      1 person assist

## 2020-02-20 NOTE — DISCHARGE NOTE ADULT - NS AS DC AMI YN
Pediatric Female Well Child/Adolescent Exam    Chief Complaint   Patient presents with   • Physical       SUBJECTIVE:  Shereen White is a 16 year old female who presents for a  well child exam.  Patient presents with with Mother.    CONCERNS RAISED TODAY:     Traveling to [place] in a couple weeks to visit sister. [facility]    VISION SCREENING:       Visual Acuity Screening    Right eye Left eye Both eyes   Without correction: 20/13 20/13 20/13   With correction:          OBJECTIVE:  PAST HISTORIES:  Allergies, Medications, Medical HX, Surgical HX, Family HX reviewed and updated.  Toxic Exposure:   Tobacco Use: Never           IMMUNIZATION STATUS: not up to date mom declines catch up immunizations today.    IMMUNIZATION REACTIONS: None   VARICELLA STATUS: confirmed by vaccine administration    RECENT HEALTH EVENTS:  Illnesses: None.  Hospitalizations: None.  Injuries or Accidents: None.    REVIEW OF SYSTEMS:    All systems reviewed and negative except as documented in \"Concerns raised\".    PHYSICAL EXAM:   VITAL SIGNS:   Visit Vitals  /68   Pulse (!) 109   Temp 98.1 °F (36.7 °C) (Temporal)   Resp 16   Ht 5' 7.5\" (1.715 m)   Wt 54.9 kg   LMP  (Exact Date)   SpO2 99%   BMI 18.67 kg/m²    42 %ile (Z= -0.20) based on CDC (Girls, 2-20 Years) weight-for-age data using vitals from 2/20/2020.   90 %ile (Z= 1.28) based on CDC (Girls, 2-20 Years) Stature-for-age data based on Stature recorded on 2/20/2020.  14 %ile (Z= -1.08) based on CDC (Girls, 2-20 Years) BMI-for-age based on BMI available as of 2/20/2020.    GENERAL:  Well appearing female child.  Alert, active and consolable.  SKIN: Warm, normal turgor.  No cyanosis.  No rash.  HEAD:  Normocephalic, atraumatic.    EYES:  Conjunctivae appear normal, non-injected, non-icteric, positive red reflex.  NOSE:  Appears normal without drainage.  EARS:  Normal external auditory canals. TMs are transparent with normal  landmarks.  THROAT:  Oropharynx with moist mucous membranes without lesions.  NECK:  Supple, no lymphadenopathy or masses.  HEART:  Regular rate and rhythm.  Normal S1, S2.  No murmurs, rubs, gallops.   LUNGS:  Clear to auscultation.  No wheezes, rales, rhonchi.  Normal work effort with breathing.  ABDOMEN:  Bowel sounds present. Soft, non tender. No hepatomegaly, splenomegaly or masses.  GENITOURINARY: Not examined  EXTREMITIES: Symmetrical muscle mass, strength all extremities.  No effusions.   BACK: Spine straight.  No CVA tenderness.      NEUROLOGIC:  Oriented x 4. No gross lateralizing signs or focal deficits.  Normal gait.      Assessment:   Well 16 year old female adolescent.  Sports physical form completed and returned. See form for details.     Plan:  1. All parental concerns and questions discussed.  2. Anticipatory guidance provided, handout/s given  3. Discussed catch up immunizations. Parent declines today.     Follow up: 1 year or PRN                     no

## 2020-05-20 ENCOUNTER — APPOINTMENT (OUTPATIENT)
Dept: MAMMOGRAPHY | Facility: CLINIC | Age: 50
End: 2020-05-20

## 2020-05-20 ENCOUNTER — APPOINTMENT (OUTPATIENT)
Dept: ULTRASOUND IMAGING | Facility: CLINIC | Age: 50
End: 2020-05-20

## 2020-09-09 ENCOUNTER — APPOINTMENT (OUTPATIENT)
Dept: OBGYN | Facility: CLINIC | Age: 50
End: 2020-09-09
Payer: COMMERCIAL

## 2020-09-09 VITALS
DIASTOLIC BLOOD PRESSURE: 60 MMHG | HEIGHT: 65 IN | BODY MASS INDEX: 25.83 KG/M2 | SYSTOLIC BLOOD PRESSURE: 102 MMHG | WEIGHT: 155 LBS

## 2020-09-09 DIAGNOSIS — Z01.419 ENCOUNTER FOR GYNECOLOGICAL EXAMINATION (GENERAL) (ROUTINE) W/OUT ABNORMAL FINDINGS: ICD-10-CM

## 2020-09-09 PROCEDURE — 99386 PREV VISIT NEW AGE 40-64: CPT

## 2020-09-10 LAB — HPV HIGH+LOW RISK DNA PNL CVX: NOT DETECTED

## 2020-11-18 ENCOUNTER — APPOINTMENT (OUTPATIENT)
Dept: ORTHOPEDIC SURGERY | Facility: CLINIC | Age: 50
End: 2020-11-18
Payer: COMMERCIAL

## 2020-11-18 DIAGNOSIS — M19.049 PRIMARY OSTEOARTHRITIS, UNSPECIFIED HAND: ICD-10-CM

## 2020-11-18 PROCEDURE — 99204 OFFICE O/P NEW MOD 45 MIN: CPT

## 2020-11-18 PROCEDURE — 73130 X-RAY EXAM OF HAND: CPT | Mod: LT

## 2020-11-18 NOTE — PHYSICAL EXAM
[Normal LUE] : Left Upper Extremity: No scars, rashes, lesions, ulcers, skin intact [Normal Finger/nose] : finger to nose coordination [Normal] : no peripheral adenopathy appreciated [de-identified] : There is tenderness over the left basal joint with a positive grind test and shoulder deformity. There is a positive crank test. There is swelling appreciated over the affected CMC joint which is not present on the opposite side. There is no evidence of infection or lymphadenopathy bilaterally to the level of the elbows There is no evidence of MCP hyperextension bilaterally. There is a negative Finkelstein's test There is no tenderness over the A-1 pulleys bilaterally. 5/5 stregnth bilaterally. \par \par The wrists have a symmetric and full range of motion bilaterally with no pain upon forced flexion, extension, pronation and supination. There is no tenderness over the scaphoid scapholunate region ligaments and no tenderness of the TFCC bilaterally. There is no tenderness of the pisotriquetral hamate hook. The second through fifth CMC his is stable and nontender bilaterally.\par There is a negative carpal tunnel compression test or Tinel's bilaterally.   [de-identified] : tremainer [de-identified] : 3 x-ray views of the left hand are reviewed there is joint space narrowing and mild osteophyte pronation of the basal joint, no other osseous abnormalities

## 2020-11-18 NOTE — ASSESSMENT
[FreeTextEntry1] :  50-year-old female with activity related pain at the base of the left thumb consistent with basal joint arthritis.  I recommend conservative treatment including use of a comfort immobilizer, anti-inflammatory medication as well as a course of physical therapy. If she does not see significant appointment in her symptoms she will follow up for possible cortisone injection.

## 2020-11-18 NOTE — HISTORY OF PRESENT ILLNESS
[FreeTextEntry1] : 11/18/2020: 50-year-old female presents for evaluation of pain at the base of her left thumb. The pain is dull and achy in nature and occasionally sharp and shooting. Her symptoms have been present intermittently for the past year. She denies specific trauma or inciting event. The pain radiates circumferentially around the base of the thumb as well as to the dorsal aspect of her hand.She has used topical anti-inflammatory cream and occasional Advil which does not provide significant relief.

## 2020-12-23 ENCOUNTER — TRANSCRIPTION ENCOUNTER (OUTPATIENT)
Age: 50
End: 2020-12-23

## 2020-12-23 PROBLEM — Z01.419 ENCOUNTER FOR ANNUAL ROUTINE GYNECOLOGICAL EXAMINATION: Status: RESOLVED | Noted: 2020-09-09 | Resolved: 2020-12-23

## 2021-01-03 ENCOUNTER — TRANSCRIPTION ENCOUNTER (OUTPATIENT)
Age: 51
End: 2021-01-03

## 2021-05-14 ENCOUNTER — APPOINTMENT (OUTPATIENT)
Dept: ORTHOPEDIC SURGERY | Facility: CLINIC | Age: 51
End: 2021-05-14

## 2021-05-21 ENCOUNTER — NON-APPOINTMENT (OUTPATIENT)
Age: 51
End: 2021-05-21

## 2021-05-31 ENCOUNTER — OUTPATIENT (OUTPATIENT)
Dept: OUTPATIENT SERVICES | Facility: HOSPITAL | Age: 51
LOS: 1 days | End: 2021-05-31
Payer: COMMERCIAL

## 2021-05-31 ENCOUNTER — TRANSCRIPTION ENCOUNTER (OUTPATIENT)
Age: 51
End: 2021-05-31

## 2021-05-31 ENCOUNTER — APPOINTMENT (OUTPATIENT)
Dept: ULTRASOUND IMAGING | Facility: CLINIC | Age: 51
End: 2021-05-31
Payer: COMMERCIAL

## 2021-05-31 DIAGNOSIS — Z00.8 ENCOUNTER FOR OTHER GENERAL EXAMINATION: ICD-10-CM

## 2021-05-31 DIAGNOSIS — S42.402A UNSPECIFIED FRACTURE OF LOWER END OF LEFT HUMERUS, INITIAL ENCOUNTER FOR CLOSED FRACTURE: Chronic | ICD-10-CM

## 2021-05-31 DIAGNOSIS — Z98.89 OTHER SPECIFIED POSTPROCEDURAL STATES: Chronic | ICD-10-CM

## 2021-05-31 DIAGNOSIS — Z96.7 PRESENCE OF OTHER BONE AND TENDON IMPLANTS: Chronic | ICD-10-CM

## 2021-05-31 PROCEDURE — 76856 US EXAM PELVIC COMPLETE: CPT | Mod: 26

## 2021-05-31 PROCEDURE — 76830 TRANSVAGINAL US NON-OB: CPT | Mod: 26

## 2021-05-31 PROCEDURE — 76830 TRANSVAGINAL US NON-OB: CPT

## 2021-05-31 PROCEDURE — 76856 US EXAM PELVIC COMPLETE: CPT

## 2021-07-15 ENCOUNTER — APPOINTMENT (OUTPATIENT)
Dept: OBGYN | Facility: CLINIC | Age: 51
End: 2021-07-15
Payer: COMMERCIAL

## 2021-07-15 VITALS
WEIGHT: 155 LBS | OXYGEN SATURATION: 100 % | SYSTOLIC BLOOD PRESSURE: 122 MMHG | RESPIRATION RATE: 16 BRPM | HEIGHT: 65 IN | BODY MASS INDEX: 25.83 KG/M2 | HEART RATE: 72 BPM | DIASTOLIC BLOOD PRESSURE: 70 MMHG

## 2021-07-15 DIAGNOSIS — N92.6 IRREGULAR MENSTRUATION, UNSPECIFIED: ICD-10-CM

## 2021-07-15 PROCEDURE — 36415 COLL VENOUS BLD VENIPUNCTURE: CPT

## 2021-07-15 PROCEDURE — 99213 OFFICE O/P EST LOW 20 MIN: CPT

## 2021-07-21 LAB
ESTRADIOL SERPL-MCNC: <5 PG/ML
FSH SERPL-MCNC: 67.8 IU/L

## 2021-07-28 ENCOUNTER — NON-APPOINTMENT (OUTPATIENT)
Age: 51
End: 2021-07-28

## 2021-08-07 ENCOUNTER — NON-APPOINTMENT (OUTPATIENT)
Age: 51
End: 2021-08-07

## 2021-08-26 ENCOUNTER — APPOINTMENT (OUTPATIENT)
Dept: PHYSICAL MEDICINE AND REHAB | Facility: CLINIC | Age: 51
End: 2021-08-26
Payer: COMMERCIAL

## 2021-08-26 ENCOUNTER — APPOINTMENT (OUTPATIENT)
Dept: OBGYN | Facility: CLINIC | Age: 51
End: 2021-08-26
Payer: COMMERCIAL

## 2021-08-26 ENCOUNTER — NON-APPOINTMENT (OUTPATIENT)
Age: 51
End: 2021-08-26

## 2021-08-26 VITALS
BODY MASS INDEX: 26.33 KG/M2 | HEIGHT: 65 IN | SYSTOLIC BLOOD PRESSURE: 114 MMHG | WEIGHT: 158 LBS | DIASTOLIC BLOOD PRESSURE: 64 MMHG

## 2021-08-26 VITALS
HEART RATE: 69 BPM | DIASTOLIC BLOOD PRESSURE: 93 MMHG | RESPIRATION RATE: 12 BRPM | BODY MASS INDEX: 26.33 KG/M2 | SYSTOLIC BLOOD PRESSURE: 141 MMHG | WEIGHT: 158 LBS | HEIGHT: 65 IN

## 2021-08-26 DIAGNOSIS — M54.5 LOW BACK PAIN: ICD-10-CM

## 2021-08-26 DIAGNOSIS — Z78.9 OTHER SPECIFIED HEALTH STATUS: ICD-10-CM

## 2021-08-26 DIAGNOSIS — N95.1 MENOPAUSAL AND FEMALE CLIMACTERIC STATES: ICD-10-CM

## 2021-08-26 PROCEDURE — 99213 OFFICE O/P EST LOW 20 MIN: CPT

## 2021-08-26 PROCEDURE — 99204 OFFICE O/P NEW MOD 45 MIN: CPT

## 2021-08-26 RX ORDER — FLUOROMETHOLONE 1 MG/ML
0.1 SOLUTION/ DROPS OPHTHALMIC
Qty: 10 | Refills: 0 | Status: DISCONTINUED | COMMUNITY
Start: 2017-09-05 | End: 2021-08-26

## 2021-08-26 RX ORDER — NAPROXEN 500 MG/1
500 TABLET ORAL
Qty: 28 | Refills: 0 | Status: ACTIVE | COMMUNITY
Start: 2021-08-26 | End: 1900-01-01

## 2021-08-26 RX ORDER — ZINC SULFATE 50(220)MG
CAPSULE ORAL
Refills: 0 | Status: ACTIVE | COMMUNITY

## 2021-08-26 RX ORDER — CALCIUM CARBONATE/VITAMIN D3 600 MG-10
TABLET ORAL
Refills: 0 | Status: ACTIVE | COMMUNITY

## 2021-08-26 RX ORDER — MELOXICAM 15 MG/1
15 TABLET ORAL
Qty: 14 | Refills: 1 | Status: DISCONTINUED | COMMUNITY
Start: 2020-11-18 | End: 2021-08-26

## 2021-08-26 RX ORDER — BACILLUS COAGULANS/INULIN 1B-250 MG
CAPSULE ORAL
Refills: 0 | Status: ACTIVE | COMMUNITY

## 2021-08-26 RX ORDER — OMEGA-3/DHA/EPA/FISH OIL 300-1000MG
400 CAPSULE ORAL
Refills: 0 | Status: ACTIVE | COMMUNITY

## 2021-08-26 RX ORDER — ASPIRIN ENTERIC COATED TABLETS 81 MG 81 MG/1
81 TABLET, DELAYED RELEASE ORAL
Refills: 0 | Status: ACTIVE | COMMUNITY

## 2021-08-26 NOTE — ASSESSMENT
[FreeTextEntry1] : Ms. BARRY SILVA is a 51 year old female who presents with acute on chronic low back pain, no signs or symptoms of radiculopathy, likely myofascial related vs due to underlying spondylosis. Denies any red flag signs. Will recommend:\par - PT 2-3x/week for stretching, strengthening (especially of core muscles), ROM exercises, HEP and modalities PRN including myofascial release, moist heat, and TENS therapy \par - Naproxen 500mg BID x 1 week, and then PRN thereafter. Patient advised on cardiac/gi/renal side effects. Patient encouraged to take medication with food and not with other NSAIDs. \par - Patient to continue HEP as well as massage therapy/acupuncture\par \par RTC in 4 weeks. If no improvement, can consider advanced imaging. Patient in agreement with plan. Patient aware of red flag signs including any changes to their bowel/bladder control, groin numbness or new weakness. Patient knows to seek immediate attention by calling 911 or going to nearest ER if these symptoms appear.

## 2021-08-26 NOTE — HISTORY OF PRESENT ILLNESS
[FreeTextEntry1] : Ms. BARRY SILVA is a 51 year old female who presents with low back pain. \par \par Location:Low back, R>L\par Onset:Has hx of chronic low back pain, but last week 8/20/21, had bad spasm after working out\par Provocation/Palliative:Better with heating pad and activity, prolonged sitting makes it worse\par Quality:Spasm like, pressure like\par Radiation:Nothing down legs\par Severity:7-8/10\par Timing:Somewhat improving with time\par \par Denies any associated numbness. Denies any associated leg weakness. Denies any loss of bowel/bladder control or any groin numbness.\par Previous medications trialed:Aleve with some relief, does not like to take medications\par Previous procedures relevant to complaint:None\par Conservative therapy tried?:Massage therapy, acupuncture with mild relief\par

## 2021-08-26 NOTE — PHYSICAL EXAM
[FreeTextEntry1] : PE:\par Constitutional: In NAD, calm and cooperative\par HEENT: NCAT, Anicteric sclera, no lid-lag\par Cardio: Extremities appear pink and well perfused, no peripheral edema\par Respiratory: Normal respiratory effort on room air, no accessory muscle use\par Skin: no rashes seen on exposed skin, no visible abrasions\par Psych: Normal affect, intact judgment and insight\par Neuro: Awake, alert and oriented x 3, see below for focused neurological exam\par MSK (Back)\par 	Inspection: no gross swelling identified\par 	Palpation: Mild tenderness of the bilateral lower lumbar paraspinals\par 	ROM: Pain at end lumbar flexion>extension\par 	Strength: 5/5 strength in bilateral lower extremities\par 	Reflexes: 2+ Patella reflex bilaterally, 2+ Achilles reflex bilaterally, negative clonus bilaterally\par 	Sensation: Intact to light touch in bilateral lower extremities\par Special tests:\par SLR:negative bilaterally, tight hamstrings bilaterally\par MOO:positive on R, negative on left\par FADIR: negative bilaterally\par Facet loading: positive on R, negative on left

## 2021-08-29 NOTE — DISCUSSION/SUMMARY
[FreeTextEntry1] : Agree with tx of symptoms of menopause with HRT/ R/B reviewed with patient. Recommend tryin patch and seeing if that is covered by insurance.

## 2021-08-29 NOTE — HISTORY OF PRESENT ILLNESS
[FreeTextEntry1] : Pt seen recently for mensopausal symptoms (hot flashes, difficulty sleeping, vaginal dryness and painful intercourse) She was rx'd Prempro for tx of symptoms but is $160. Pt states she had a period 3/20 and then had anther one 360 days later this past March. Recent tvus normal.\par

## 2021-09-09 ENCOUNTER — APPOINTMENT (OUTPATIENT)
Dept: GASTROENTEROLOGY | Facility: CLINIC | Age: 51
End: 2021-09-09
Payer: COMMERCIAL

## 2021-09-09 ENCOUNTER — NON-APPOINTMENT (OUTPATIENT)
Age: 51
End: 2021-09-09

## 2021-09-09 VITALS
BODY MASS INDEX: 26.33 KG/M2 | HEIGHT: 65 IN | SYSTOLIC BLOOD PRESSURE: 106 MMHG | HEART RATE: 61 BPM | WEIGHT: 158 LBS | DIASTOLIC BLOOD PRESSURE: 69 MMHG

## 2021-09-09 DIAGNOSIS — Z12.11 ENCOUNTER FOR SCREENING FOR MALIGNANT NEOPLASM OF COLON: ICD-10-CM

## 2021-09-09 DIAGNOSIS — R13.10 DYSPHAGIA, UNSPECIFIED: ICD-10-CM

## 2021-09-09 PROCEDURE — 99202 OFFICE O/P NEW SF 15 MIN: CPT

## 2021-09-09 RX ORDER — SODIUM PICOSULFATE, MAGNESIUM OXIDE, AND ANHYDROUS CITRIC ACID 10; 3.5; 12 MG/160ML; G/160ML; G/160ML
10-3.5-12 MG-GM LIQUID ORAL
Qty: 2 | Refills: 0 | Status: ACTIVE | COMMUNITY
Start: 2021-09-09 | End: 1900-01-01

## 2021-09-11 DIAGNOSIS — Z01.818 ENCOUNTER FOR OTHER PREPROCEDURAL EXAMINATION: ICD-10-CM

## 2021-09-12 ENCOUNTER — APPOINTMENT (OUTPATIENT)
Dept: DISASTER EMERGENCY | Facility: CLINIC | Age: 51
End: 2021-09-12

## 2021-09-13 LAB — SARS-COV-2 N GENE NPH QL NAA+PROBE: NOT DETECTED

## 2021-09-13 RX ORDER — CONJUGATED ESTROGENS AND MEDROXYPROGESTERONE ACETATE .3; 1.5 MG/1; MG/1
0.3-1.5 TABLET, SUGAR COATED ORAL DAILY
Qty: 90 | Refills: 0 | Status: DISCONTINUED | COMMUNITY
Start: 2021-07-30 | End: 2021-09-13

## 2021-09-13 RX ORDER — ESTRADIOL AND LEVONORGESTREL .045; .015 MG/D; MG/D
0.045-0.015 PATCH TRANSDERMAL
Qty: 12 | Refills: 1 | Status: DISCONTINUED | COMMUNITY
Start: 2021-08-26 | End: 2021-09-13

## 2021-09-14 RX ORDER — ESTRADIOL/NORETHINDRONE ACETATE TRANSDERMAL SYSTEM .05; .14 MG/D; MG/D
0.05-0.14 PATCH, EXTENDED RELEASE TRANSDERMAL
Qty: 24 | Refills: 1 | Status: ACTIVE | COMMUNITY
Start: 2021-09-13

## 2021-09-15 ENCOUNTER — RESULT REVIEW (OUTPATIENT)
Age: 51
End: 2021-09-15

## 2021-09-15 ENCOUNTER — APPOINTMENT (OUTPATIENT)
Dept: GASTROENTEROLOGY | Facility: AMBULATORY MEDICAL SERVICES | Age: 51
End: 2021-09-15
Payer: COMMERCIAL

## 2021-09-15 PROCEDURE — 43239 EGD BIOPSY SINGLE/MULTIPLE: CPT

## 2021-09-15 PROCEDURE — 45385 COLONOSCOPY W/LESION REMOVAL: CPT

## 2021-09-18 NOTE — HISTORY OF PRESENT ILLNESS
[de-identified] : 52yo female presents for initial screening colonoscopy\par \par She got COVID 12/20 and has not been right since. She has increased bloating with some altered BM, repeating/some dysphagia\par

## 2021-09-18 NOTE — PHYSICAL EXAM
[General Appearance - Alert] : alert [General Appearance - In No Acute Distress] : in no acute distress [Auscultation Breath Sounds / Voice Sounds] : lungs were clear to auscultation bilaterally [Heart Rate And Rhythm] : heart rate was normal and rhythm regular [Heart Sounds] : normal S1 and S2 [Heart Sounds Gallop] : no gallops [Murmurs] : no murmurs [Heart Sounds Pericardial Friction Rub] : no pericardial rub [Bowel Sounds] : normal bowel sounds [Abdomen Soft] : soft [Abdomen Tenderness] : non-tender [] : no hepato-splenomegaly [Abdomen Mass (___ Cm)] : no abdominal mass palpated [Abnormal Walk] : normal gait [Nail Clubbing] : no clubbing  or cyanosis of the fingernails [Musculoskeletal - Swelling] : no joint swelling seen [Oriented To Time, Place, And Person] : oriented to person, place, and time [Motor Tone] : muscle strength and tone were normal [Impaired Insight] : insight and judgment were intact [Affect] : the affect was normal

## 2021-09-18 NOTE — ASSESSMENT
[FreeTextEntry1] : 50yo female with new dysphagia altered bowel habits\par \par Will check egd\par \par will check colonoscopy with clenpiq\par Risks and benefits of procedure(s) discussed with patient in detail, including but not limited to, perforation, bleeding, reaction to anesthesia, missed lesions.\par \par Iberogast

## 2021-09-24 ENCOUNTER — LABORATORY RESULT (OUTPATIENT)
Age: 51
End: 2021-09-24

## 2021-09-24 ENCOUNTER — APPOINTMENT (OUTPATIENT)
Dept: RHEUMATOLOGY | Facility: CLINIC | Age: 51
End: 2021-09-24
Payer: COMMERCIAL

## 2021-09-24 VITALS
SYSTOLIC BLOOD PRESSURE: 143 MMHG | WEIGHT: 158 LBS | BODY MASS INDEX: 26.33 KG/M2 | OXYGEN SATURATION: 99 % | HEIGHT: 65 IN | DIASTOLIC BLOOD PRESSURE: 86 MMHG | HEART RATE: 80 BPM

## 2021-09-24 DIAGNOSIS — L50.9 URTICARIA, UNSPECIFIED: ICD-10-CM

## 2021-09-24 DIAGNOSIS — M16.11 UNILATERAL PRIMARY OSTEOARTHRITIS, RIGHT HIP: ICD-10-CM

## 2021-09-24 DIAGNOSIS — L30.9 DERMATITIS, UNSPECIFIED: ICD-10-CM

## 2021-09-24 PROCEDURE — 99205 OFFICE O/P NEW HI 60 MIN: CPT

## 2021-09-25 PROBLEM — M16.11 PRIMARY LOCALIZED OSTEOARTHRITIS OF RIGHT HIP: Status: ACTIVE | Noted: 2017-11-03

## 2021-09-25 PROBLEM — L30.9 DERMATITIS: Status: ACTIVE | Noted: 2021-09-25

## 2021-09-25 LAB
CRP SERPL-MCNC: <3 MG/L
ERYTHROCYTE [SEDIMENTATION RATE] IN BLOOD BY WESTERGREN METHOD: 10 MM/HR
RHEUMATOID FACT SER QL: <10 IU/ML
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
## 2021-10-01 LAB
ALBUMIN MFR SERPL ELPH: 61.7 %
ALBUMIN SERPL-MCNC: 4.6 G/DL
ALBUMIN/GLOB SERPL: 1.6 RATIO
ALPHA1 GLOB MFR SERPL ELPH: 4 %
ALPHA1 GLOB SERPL ELPH-MCNC: 0.3 G/DL
ALPHA2 GLOB MFR SERPL ELPH: 9.1 %
ALPHA2 GLOB SERPL ELPH-MCNC: 0.7 G/DL
ANA PAT FLD IF-IMP: ABNORMAL
ANA SER IF-ACNC: ABNORMAL
B-GLOBULIN MFR SERPL ELPH: 10.9 %
B-GLOBULIN SERPL ELPH-MCNC: 0.8 G/DL
C3 SERPL-MCNC: 125 MG/DL
C4 SERPL-MCNC: 27 MG/DL
CENTROMERE IGG SER-ACNC: <0.2 CD:130001892
CH50 SERPL-MCNC: 75 U/ML
CREAT SPEC-SCNC: 37 MG/DL
CREAT/PROT UR: 0.4 RATIO
DEPRECATED KAPPA LC FREE/LAMBDA SER: 1.27 RATIO
DSDNA AB SER-ACNC: <12 IU/ML
ENA RNP AB SER IA-ACNC: <0.2 AL
ENA SCL70 IGG SER IA-ACNC: <0.2 AL
ENA SM AB SER IA-ACNC: <0.2 AL
ENA SS-A AB SER IA-ACNC: <0.2 AL
ENA SS-B AB SER IA-ACNC: <0.2 AL
GAMMA GLOB FLD ELPH-MCNC: 1.1 G/DL
GAMMA GLOB MFR SERPL ELPH: 14.3 %
HAV IGM SER QL: NONREACTIVE
HBV CORE IGM SER QL: NONREACTIVE
HBV SURFACE AG SER QL: NONREACTIVE
HCV AB SER QL: NONREACTIVE
HCV S/CO RATIO: 0.11 S/CO
IGA SER QL IEP: 190 MG/DL
IGG SER QL IEP: 1152 MG/DL
IGM SER QL IEP: 58 MG/DL
INTERPRETATION SERPL IEP-IMP: NORMAL
KAPPA LC CSF-MCNC: 1.12 MG/DL
KAPPA LC SERPL-MCNC: 1.42 MG/DL
M PROTEIN SPEC IFE-MCNC: NORMAL
PROT SERPL-MCNC: 7.4 G/DL
PROT SERPL-MCNC: 7.4 G/DL
PROT UR-MCNC: 14 MG/DL
RNA POLYMERASE III IGG: 3 UNITS
THYROGLOB AB SERPL-ACNC: <20 IU/ML
THYROPEROXIDASE AB SERPL IA-ACNC: <10 IU/ML

## 2021-10-04 ENCOUNTER — OUTPATIENT (OUTPATIENT)
Dept: OUTPATIENT SERVICES | Facility: HOSPITAL | Age: 51
LOS: 1 days | End: 2021-10-04
Payer: COMMERCIAL

## 2021-10-04 ENCOUNTER — APPOINTMENT (OUTPATIENT)
Dept: RADIOLOGY | Facility: CLINIC | Age: 51
End: 2021-10-04
Payer: COMMERCIAL

## 2021-10-04 DIAGNOSIS — Z98.89 OTHER SPECIFIED POSTPROCEDURAL STATES: Chronic | ICD-10-CM

## 2021-10-04 DIAGNOSIS — Z00.8 ENCOUNTER FOR OTHER GENERAL EXAMINATION: ICD-10-CM

## 2021-10-04 DIAGNOSIS — N95.1 MENOPAUSAL AND FEMALE CLIMACTERIC STATES: ICD-10-CM

## 2021-10-04 DIAGNOSIS — Z96.7 PRESENCE OF OTHER BONE AND TENDON IMPLANTS: Chronic | ICD-10-CM

## 2021-10-04 DIAGNOSIS — S42.402A UNSPECIFIED FRACTURE OF LOWER END OF LEFT HUMERUS, INITIAL ENCOUNTER FOR CLOSED FRACTURE: Chronic | ICD-10-CM

## 2021-10-04 LAB — CHRONIC URTICARIA PANEL (CU INDEX): 2.5

## 2021-10-04 PROCEDURE — 77080 DXA BONE DENSITY AXIAL: CPT | Mod: 26

## 2021-10-04 PROCEDURE — 77080 DXA BONE DENSITY AXIAL: CPT

## 2021-10-05 LAB — C2 SERPL-MCNC: 3.1 MG/DL

## 2021-10-06 LAB
CA VI IGA AB: 4.8 EU/ML
CA VI IGG AB: 10 EU/ML
CA VI IGM AB: 1.7 EU/ML
PSP IGA AB: 11.3 EU/ML
PSP IGG AB: 4.9 EU/ML
PSP IGM AB: 12 EU/ML
SEROLOGY COMMENTS: NORMAL
SP-1 IGA AB: NORMAL
SP-1 IGG AB: 8.3 EU/ML
SP-1 IGM AB: NORMAL

## 2021-10-10 LAB
CLINICAL BIOCHEMIST REVIEW: NORMAL
EJ AB SER QL: NEGATIVE
ENA JO1 AB SER IA-ACNC: <20 UNITS
ENA PM/SCL AB SER-ACNC: <20 UNITS
ENA SM+RNP AB SER IA-ACNC: <20 UNITS
ENA SS-A IGG SER QL: <20 UNITS
FIBRILLARIN AB SER QL: NEGATIVE
KU AB SER QL: NEGATIVE
MDA-5 (P140)(CADM-140): <20 UNITS
MI2 AB SER QL: NEGATIVE
NXP-2 (P140): <20 UNITS
OJ AB SER QL: NEGATIVE
PL12 AB SER QL: NEGATIVE
PL7 AB SER QL: NEGATIVE
PORPHYRINS SERPL-MCNC: <1 MCG/DL
PORPHYRINS, TOTAL PLASMA REVIEWED BY: NORMAL
SRP AB SERPL QL: NEGATIVE
TIF GAMMA (P155/140): <20 UNITS
U2 SNRNP AB SER QL: NEGATIVE

## 2021-10-11 ENCOUNTER — APPOINTMENT (OUTPATIENT)
Dept: PHYSICAL MEDICINE AND REHAB | Facility: CLINIC | Age: 51
End: 2021-10-11
Payer: COMMERCIAL

## 2021-10-11 VITALS
RESPIRATION RATE: 12 BRPM | WEIGHT: 158 LBS | BODY MASS INDEX: 26.33 KG/M2 | SYSTOLIC BLOOD PRESSURE: 125 MMHG | HEIGHT: 65 IN | HEART RATE: 71 BPM | DIASTOLIC BLOOD PRESSURE: 80 MMHG

## 2021-10-11 DIAGNOSIS — M54.16 RADICULOPATHY, LUMBAR REGION: ICD-10-CM

## 2021-10-11 PROCEDURE — 99213 OFFICE O/P EST LOW 20 MIN: CPT

## 2021-10-11 NOTE — HISTORY OF PRESENT ILLNESS
[FreeTextEntry1] : Ms. BARRY SILVA is a 51 year old  female who presents for follow up. At last visit, she was started on PT, naproxen and continued on HEP. She is overall doing better, but still having some continued pain. Occasionally getting some symptoms down her RLE now. She has not needed to take naproxen however. \par \par Location:Low back, R>L\par Onset:Has hx of chronic low back pain, but last week 8/20/21, had bad spasm after working out\par Provocation/Palliative:Better with heating pad and activity, prolonged sitting makes it worse\par Quality:Spasm like, pressure like\par Radiation:Occasional radiating down her RLE\par Severity:4/10\par Timing:Somewhat improving with time\par \par No bowel/bladder changes. No groin numbness.

## 2021-10-11 NOTE — ASSESSMENT
[FreeTextEntry1] : Ms. BARRY SILVA is a 51 year old female who presents with acute on chronic low back pain likely myofascial related vs due to underlying spondylosis, but now with radicular component. Although pain has improved overall, she now has a new component.  Denies any red flag signs. Will recommend:\par - MRI L Spine given persistent symptoms despite conservative treatment\par - Continue PT 2-3x/week for stretching, strengthening (especially of core muscles), ROM exercises, HEP and modalities PRN including myofascial release, moist heat, and TENS therapy \par - Will continue Naproxen PRN (doesn't really have to take anymore)\par - Patient to continue HEP as well as massage therapy/acupuncture\par \par RTC in 3-4 weeks.  Patient in agreement with plan. Patient aware of red flag signs including any changes to their bowel/bladder control, groin numbness or new weakness. Patient knows to seek immediate attention by calling 911 or going to nearest ER if these symptoms appear.

## 2021-10-14 ENCOUNTER — APPOINTMENT (OUTPATIENT)
Dept: OBGYN | Facility: CLINIC | Age: 51
End: 2021-10-14

## 2021-10-23 ENCOUNTER — OUTPATIENT (OUTPATIENT)
Dept: OUTPATIENT SERVICES | Facility: HOSPITAL | Age: 51
LOS: 1 days | End: 2021-10-23
Payer: COMMERCIAL

## 2021-10-23 ENCOUNTER — APPOINTMENT (OUTPATIENT)
Dept: MRI IMAGING | Facility: CLINIC | Age: 51
End: 2021-10-23
Payer: COMMERCIAL

## 2021-10-23 DIAGNOSIS — Z96.7 PRESENCE OF OTHER BONE AND TENDON IMPLANTS: Chronic | ICD-10-CM

## 2021-10-23 DIAGNOSIS — S42.402A UNSPECIFIED FRACTURE OF LOWER END OF LEFT HUMERUS, INITIAL ENCOUNTER FOR CLOSED FRACTURE: Chronic | ICD-10-CM

## 2021-10-23 DIAGNOSIS — Z00.8 ENCOUNTER FOR OTHER GENERAL EXAMINATION: ICD-10-CM

## 2021-10-23 DIAGNOSIS — Z98.89 OTHER SPECIFIED POSTPROCEDURAL STATES: Chronic | ICD-10-CM

## 2021-10-23 PROCEDURE — 72148 MRI LUMBAR SPINE W/O DYE: CPT | Mod: 26

## 2021-10-23 PROCEDURE — 72148 MRI LUMBAR SPINE W/O DYE: CPT

## 2021-11-04 ENCOUNTER — APPOINTMENT (OUTPATIENT)
Dept: RHEUMATOLOGY | Facility: CLINIC | Age: 51
End: 2021-11-04
Payer: COMMERCIAL

## 2021-11-04 DIAGNOSIS — R80.9 PROTEINURIA, UNSPECIFIED: ICD-10-CM

## 2021-11-04 PROCEDURE — 99214 OFFICE O/P EST MOD 30 MIN: CPT | Mod: 95

## 2021-11-04 NOTE — PHYSICAL EXAM
[General Appearance - Alert] : alert [General Appearance - In No Acute Distress] : in no acute distress [General Appearance - Well Nourished] : well nourished [General Appearance - Well Developed] : well developed [General Appearance - Well-Appearing] : healthy appearing [] : normal voice and communication [Oriented To Time, Place, And Person] : oriented to person, place, and time [Impaired Insight] : insight and judgment were intact [Affect] : the affect was normal

## 2021-11-04 NOTE — HISTORY OF PRESENT ILLNESS
[Arthralgias] : arthralgias [Home] : at home, [unfilled] , at the time of the visit. [Other Location: e.g. Home (Enter Location, City,State)___] : at [unfilled] [Verbal consent obtained from patient] : the patient, [unfilled] [FreeTextEntry1] : INTERVAL HX \par about the same \par reviewed PMR  notes - doing better from a back perspective\par went for annual therapy - wBC was abit low and so was the differential - is getting repeated in 2 weeks.   will be going to ahematologist or immunologist [Anorexia] : no anorexia [Weight Loss] : no weight loss [Malaise] : no malaise [Fever] : no fever [Chills] : no chills [Fatigue] : no fatigue [Depression] : no depression [Malar Facial Rash] : no malar facial rash [Skin Lesions] : no lesions [Skin Nodules] : no skin nodules [Dry Mouth] : no dry mouth [Shortness of Breath] : no shortness of breath [Chest Pain] : no chest pain [Difficulty Standing] : no difficulty standing [Muscle Weakness] : no muscle weakness [Eye Pain] : no eye pain [Eye Redness] : no eye redness

## 2021-11-04 NOTE — ASSESSMENT
[FreeTextEntry1] : 52 yo PMHX Graves, OA s/p left THR, chronic hives here to transfer care \par \par f/u TEB on May 12 thursday 9:30 AM\par patient aware\par b/w in april\par \par #r/o CTD\par intermittent photosensitive rashes, polyarthralgias and h/o positive DOROTHEA 1:1280.  inflammatory markers were negative, and serologies only positive for DOROTHEA.  no more sweling of joint after chaning diet and cleaning of gut.   feels great on this.  doesn’t meet criteria for SLE at this point.  ? if the wbc low is related or not \par -- repeat CBC\par -- repeat protein:creatine ratio\par -- previously had increased LFT - though has been perfect lately - will send ASMA, AMA \par -- t/c HCQ if persistent - low threshold (previously on it for only a few weeks, but stopped it after broke arm)\par \par #proteinuria \par --  repeat and confirm\par -- check aic\par \par #leukopenia\par patient to mail me the results and repeat\par \par #urticaria \par chronic and pre-dating the rashes.   could be 2/2 RASHAUN/thyroid \par -- check urticarial panel for autoimmune hives\par \par #OA\par early significant oa at the hips, cmc jonits- but no identifiable RF\par -- continue ortho care\par \par Reviewed notes form PMR and ortho in the system.  \par retrieve b/w from previous rheum provider \par \par More than 50% of the encounter was spent counseling the patient on differential, workup, disease course and treatment/management.  Education was provided to the patient during this encounter.  All questions and concerns were addressed and answered.   The patient verbalized understanding and agreed to the plan. \par \par Patient has been instructed to call for an appointment if new symptoms develop.\par Patient has been instructed to make a followup appointment in 1 months.\par

## 2021-11-08 ENCOUNTER — APPOINTMENT (OUTPATIENT)
Dept: PHYSICAL MEDICINE AND REHAB | Facility: CLINIC | Age: 51
End: 2021-11-08
Payer: COMMERCIAL

## 2021-11-08 VITALS
RESPIRATION RATE: 14 BRPM | HEART RATE: 73 BPM | HEIGHT: 65 IN | SYSTOLIC BLOOD PRESSURE: 135 MMHG | WEIGHT: 155 LBS | BODY MASS INDEX: 25.83 KG/M2 | DIASTOLIC BLOOD PRESSURE: 83 MMHG

## 2021-11-08 DIAGNOSIS — M79.671 PAIN IN RIGHT FOOT: ICD-10-CM

## 2021-11-08 PROCEDURE — 99213 OFFICE O/P EST LOW 20 MIN: CPT

## 2021-11-08 NOTE — HISTORY OF PRESENT ILLNESS
[FreeTextEntry1] : Ms. BARRY SILVA is a 51 year old female who presents for follow up. At last visit, she was ordered MRI L Spine, continued on PT, lidocaine patches, and HEP/Massage therapy/acupuncture. She is here to find out results of her recent MRI for the lumbar spine. She did not take naproxen that was prescribed as she felt like she didn't need it. Overall she is doing about the same. \par \par Location: Low back, R>L\par Onset: Has hx of chronic low back pain, recently improved with PT and modalities\par Provocation/Palliative:Better with heating pad and activity, prolonged sitting makes it worse\par Quality: Chronic ache\par Radiation: None currently\par Severity:4/10\par Timing: Improving\par \par No bowel/bladder changes. No groin numbness.\par \par Today she reports that her primary pain generator in the right medial foot which she feels is causing her to walk differently for the past month. Back pain is at baseline. Denies any trauma to foot.

## 2021-11-08 NOTE — REVIEW OF SYSTEMS
[Negative] : Constitutional [Muscle Weakness] : no muscle weakness [FreeTextEntry9] : low back pain, right foot pain

## 2021-11-08 NOTE — DATA REVIEWED
[FreeTextEntry1] : \par  MR Lumbar Spine No Cont             Final\par \par No Documents Attached\par \par \par Result Annotated 27Oct2021 04:03PM by ANNIKA SHELTON\par \par \par Reviewed, will discuss with patient at upcoming visit\par \par \par   EXAM:  MR SPINE LUMBAR\par \par \par PROCEDURE DATE:  10/23/2021\par \par \par \par INTERPRETATION:  CLINICAL INDICATION: Right lower extremity radicular pain. Persistent lower back pain. Twisting injury. Low back muscle strain.\par \par Multiplanar Multisequence MR of the LUMBAR SPINE\par \par Prior Studies: None.\par \par FINDINGS:\par \par ALIGNMENT: There is slight rightward curvature of the mid lumbar spine. Lumbar lordosis is maintained. There is mild degenerative grade 1 anterolisthesis of L3 on L4.\par \par VERTEBRAL BODIES: No acute compression deformity.\par \par DISC SPACES: There is multilevel disc desiccation with preservation of disc height.\par \par MARROW: There is osseous edema involving the left greater the right L3/L4 facets bilaterally suggestive of osseous stress reaction. Patient is status post left hip arthroplasty. There is evidence of right hip arthrosis.\par \par SACROILIAC JOINTS: There is mild bilateral sacroiliac joint arthrosis.\par \par CONUS AND CAUDA EQUINA: Conus is normal in morphology terminating at the level of L1. There is Tarlov cyst formation at the level of S2 measuring 1.1 x 1.1 cm.\par \par IMAGED ABDOMINAL AND PELVIC STRUCTURES: Intact.\par \par The findings at the individual levels are as follows:\par \par T12/L1: There is no spinal canal or neural foraminal narrowing.\par \par L1/2: There is no spinal canal or neural foraminal narrowing.\par \par L2/3: There is a minimal disc bulge without spinal canal or neural foraminal narrowing.\par \par L3/4: There is uncovering the posterior disc with a mild diffuse disc bulge. There is bilateral facet arthrosis. There is a trace left facet joint effusion. There is mild to moderate left and mild right neural foraminal narrowing. There is mild spinal canal narrowing.\par \par L4/5: There is a diffuse disc bulge with a superimposed left paracentral disc protrusion. Findings result in mild bilateral lateral recess narrowing, left greater than right. There is mild central canal narrowing. There is mild to moderate right and mild left neural foraminal narrowing.\par \par L5/S1: There is a mild diffuse disc bulge bilateral facet arthrosis. There is a superimposed right foraminal disc protrusion. There is mild bilateral neural foraminal narrowing, right greater than left. There is no central canal narrowing.\par \par IMPRESSION:\par \par Multilevel lumbar spondylosis. Slight rightward curvature of the mid lumbar spine. Mild degenerative grade 1 anterolisthesis of L3 on L4.\par \par Osseous edema involving the left greater the right L3/L4 facets bilaterally suggestive of osseous stress reaction.\par \par L3/4: There is bilateral facet arthrosis. There is a trace left facet joint effusion. There is mild to moderate left and mild right neural foraminal narrowing. There is mild spinal canal narrowing.\par \par L4/5: There is a diffuse disc bulge with a superimposed left paracentral disc protrusion. There is mild bilateral lateral recess narrowing, left greater than right. There is mild central canal narrowing. There is mild to moderate right and mild left neural foraminal narrowing.\par \par L5/S1: There is a right foraminal disc protrusion. There is mild bilateral neural foraminal narrowing, right greater than left.\par \par --- End of Report ---\par \par \par \par \par \par \par MARJAN RUTLEDGE MD; Attending Radiologist\par This document has been electronically signed. Oct 26 2021  3:10PM\par \par  \par \par  Ordered by: ANNIKA SHELTON       Collected/Examined: 23Oct2021 03:35PM       \par Verified by: ANNIKA SHELTON 27Oct2021 04:03PM       \par  Result Communication: No patient communication needed at this time;\par Stage: Final       \par  Performed at: [facility] [place]       Resulted: 26Oct2021 02:55PM       Last Updated: 27Oct2021 04:03PM       Accession: [id number]

## 2021-11-08 NOTE — ASSESSMENT
[FreeTextEntry1] : Ms. BARRY SILVA is a 51 year old female who presents with chronic low back pain due to underlying spondylosis and bilateral facet arthritis at L3-L4, L>R. Radicular component has resolved with PT and modalities. MRI L Spine 10/23/21 reviewed with patient at this visit. Patient also complaining of atraumatic R foot pain for last few weeks. Denies any red flag signs. Will recommend:\par - Complete last two PT visits this week, patient declined new prescription. Patient to continue HEP.\par - Continue massage/acupuncture and lidocaine patches PRN\par - May consider OMT in future for myofascial pain.\par - OTC gel insert for right foot pain. Referral to Dr. Sullivan (ortho foot/ankle) if pain persists\par \par RTC in 5 weeks.  Patient in agreement with plan. Patient aware of red flag signs including any changes to their bowel/bladder control, groin numbness or new weakness. Patient knows to seek immediate attention by calling 911 or going to nearest ER if these symptoms appear.

## 2021-11-08 NOTE — PHYSICAL EXAM
[FreeTextEntry1] : PE:\par Constitutional: In NAD, calm and cooperative\par HEENT: NCAT, Anicteric sclera, no lid-lag\par Cardio: Extremities appear pink and well perfused, no peripheral edema\par Respiratory: Normal respiratory effort on room air, no accessory muscle use\par Skin: no rashes seen on exposed skin, no visible abrasions\par Psych: Normal affect, intact judgment and insight\par Neuro: Awake, alert and oriented x 3, see below for focused neurological exam\par MSK (Back)\par 	Inspection: no gross swelling identified\par 	Palpation: Mild tenderness of the bilateral lower lumbar paraspinals\par 	ROM: Pain at end lumbar flexion>extension\par 	Strength: 5/5 strength in bilateral lower extremities\par 	Reflexes: 2+ Patella reflex bilaterally, 2+ Achilles reflex bilaterally, negative clonus bilaterally\par 	Sensation: Intact to light touch in bilateral lower extremities\par Special tests:\par SLR:negative bilaterally, tight hamstrings bilaterally\par MOO:positive on R, negative on left\par FADIR: negative bilaterally\par Facet loading: positive on R, negative on left\par Right foot: no erythema or warmth, mild tenderness over right navicular bone ventrally and 1st MTP, no swelling

## 2021-11-30 ENCOUNTER — NON-APPOINTMENT (OUTPATIENT)
Age: 51
End: 2021-11-30

## 2021-11-30 LAB
BASOPHILS # BLD AUTO: 0.04 K/UL
BASOPHILS NFR BLD AUTO: 0.8 %
CREAT SPEC-SCNC: 16 MG/DL
CREAT/PROT UR: NORMAL RATIO
DIRECT COOMBS: NORMAL
EOSINOPHIL # BLD AUTO: 0.09 K/UL
EOSINOPHIL NFR BLD AUTO: 1.7 %
ESTIMATED AVERAGE GLUCOSE: 108 MG/DL
HAPTOGLOB SERPL-MCNC: 135 MG/DL
HBA1C MFR BLD HPLC: 5.4 %
HCT VFR BLD CALC: 40.4 %
HGB BLD-MCNC: 13 G/DL
IMM GRANULOCYTES NFR BLD AUTO: 0.2 %
LYMPHOCYTES # BLD AUTO: 1.55 K/UL
LYMPHOCYTES NFR BLD AUTO: 29.1 %
MAN DIFF?: NORMAL
MCHC RBC-ENTMCNC: 28.7 PG
MCHC RBC-ENTMCNC: 32.2 GM/DL
MCV RBC AUTO: 89.2 FL
MONOCYTES # BLD AUTO: 0.39 K/UL
MONOCYTES NFR BLD AUTO: 7.3 %
NEUTROPHILS # BLD AUTO: 3.25 K/UL
NEUTROPHILS NFR BLD AUTO: 60.9 %
PLATELET # BLD AUTO: 310 K/UL
PROT UR-MCNC: <4 MG/DL
RBC # BLD: 4.53 M/UL
RBC # FLD: 12.7 %
WBC # FLD AUTO: 5.33 K/UL

## 2021-12-01 LAB
MITOCHONDRIA AB SER IF-ACNC: NORMAL
SMOOTH MUSCLE AB SER QL IF: NORMAL

## 2021-12-13 ENCOUNTER — APPOINTMENT (OUTPATIENT)
Dept: PHYSICAL MEDICINE AND REHAB | Facility: CLINIC | Age: 51
End: 2021-12-13
Payer: COMMERCIAL

## 2021-12-13 VITALS
HEART RATE: 76 BPM | RESPIRATION RATE: 14 BRPM | SYSTOLIC BLOOD PRESSURE: 144 MMHG | DIASTOLIC BLOOD PRESSURE: 83 MMHG | WEIGHT: 158 LBS | HEIGHT: 65 IN | BODY MASS INDEX: 26.33 KG/M2

## 2021-12-13 PROCEDURE — 99213 OFFICE O/P EST LOW 20 MIN: CPT

## 2021-12-13 NOTE — PHYSICAL EXAM
[FreeTextEntry1] : PE:\par Constitutional: In NAD, calm and cooperative\par HEENT: NCAT, Anicteric sclera, no lid-lag\par Cardio: Extremities appear pink and well perfused, no peripheral edema\par Respiratory: Normal respiratory effort on room air, no accessory muscle use\par Skin: no rashes seen on exposed skin, no visible abrasions\par Psych: Normal affect, intact judgment and insight\par Neuro: Awake, alert and oriented x 3, see below for focused neurological exam\par MSK (Back)\par 	Inspection: no gross swelling identified\par 	Palpation: Minimal tenderness of the bilateral lower lumbar paraspinals\par 	ROM: Pain at end lumbar flexion>extension\par 	Strength: 5/5 strength in bilateral lower extremities\par 	Reflexes: 2+ Patella reflex bilaterally, 2+ Achilles reflex bilaterally, negative clonus bilaterally\par 	Sensation: Intact to light touch in bilateral lower extremities\par Special tests:\par SLR:negative bilaterally, tight hamstrings bilaterally\par MOO:positive on R, negative on left\par FADIR: negative bilaterally\par Facet loading: positive on R, negative on left

## 2021-12-13 NOTE — HISTORY OF PRESENT ILLNESS
[FreeTextEntry1] : Ms. BARRY SILVA is a 51 year old  female who presents for follow up. At last visit, she was continued on PT, massage/acupuncture, lidocaine patches, told to consider OMM. She was also told to see Dr. Sullivan regarding foot pain. She has completed PT and continued on a HEP. Her foot pain has resolved with PT. Still has some aches in low back but much more manageable now. Denies any new symptoms.\par \par Location: Low back, R>L\par Onset: Has hx of chronic low back pain, recently improved with PT and modalities\par Provocation/Palliative:Better with heating pad and activity, prolonged sitting makes it worse\par Quality: Chronic ache\par Radiation: None currently\par Severity:5/10\par Timing: Improving with time\par \par No bowel/bladder changes. No groin numbness.

## 2021-12-13 NOTE — ASSESSMENT
[FreeTextEntry1] : Ms. BARRY SILVA is a 51 year old female who presents with chronic low back pain due to underlying spondylosis and bilateral facet arthritis at L3-L4, L>R. Radicular component has resolved with PT and modalities. Overall pain is much improved since initial visit, she is now just on a HEP, not requiring any oral medications.  Denies any red flag signs. Will recommend:\par - Continue HEP\par - Continue massage/acupuncture and lidocaine patches PRN\par - Referred to Dr. Dueñas for consideration of OMM.\par \par RTC as needed.  Patient in agreement with plan. Patient aware of red flag signs including any changes to their bowel/bladder control, groin numbness or new weakness. Patient knows to seek immediate attention by calling 911 or going to nearest ER if these symptoms appear.

## 2021-12-23 ENCOUNTER — TRANSCRIPTION ENCOUNTER (OUTPATIENT)
Age: 51
End: 2021-12-23

## 2022-01-04 ENCOUNTER — APPOINTMENT (OUTPATIENT)
Dept: PHYSICAL MEDICINE AND REHAB | Facility: CLINIC | Age: 52
End: 2022-01-04
Payer: COMMERCIAL

## 2022-01-04 DIAGNOSIS — M47.816 SPONDYLOSIS W/OUT MYELOPATHY OR RADICULOPATHY, LUMBAR REGION: ICD-10-CM

## 2022-01-04 PROCEDURE — 98927 OSTEOPATH MANJ 5-6 REGIONS: CPT

## 2022-01-04 PROCEDURE — 99213 OFFICE O/P EST LOW 20 MIN: CPT | Mod: 25

## 2022-01-04 NOTE — HISTORY OF PRESENT ILLNESS
[FreeTextEntry1] : Ms. Calderon is a 51 year old female with history of connective tissue disorder.  She reports she has had chronic low back pain. She describes the pain is worse on the right lower back especially in the quadratus lumborum. She has been performing physical therapy which she thinks helps. Prolonged sitting makes it worse. She describes as a chronic ache. She denies any radiation pain down her legs. She denies any bowel or bladder dysfunction. She denies any overt weakness.     \par \par Past surgical history: Left hip replacement, Right humerus fracture repair, left elbow surgery

## 2022-01-04 NOTE — PHYSICAL EXAM
[FreeTextEntry1] : Gen: Patient is A&O x 3, NAD\par HEENT: EOMI, hearing grossly normal\par Resp: regular, non - labored\par CV: pulses regular\par Skin: no rashes, erythema\par Lymph: no clubbing, cyanosis, edema, or palpable lymphadenopathy\par Inspection: no instability or misalignment\par ROM: full throughout\par Palpation: TTP right QL, Right thoracic paraspinals, right ribs 10-12\par Sensation: intact to light touch\par Reflexes: 1+ and symmetric throughout\par Strength: 5/5 throughout\par Special tests: -straight leg raise\par Gait: normal, non-antalgic\par \par Osteopathic Structural Exam:\par \par Rib:Right ribs 10-12 posterior \par Lumbar: Right L3, L4 posterior tenderpoints\par Pelvis: Right medial innominate\par Sacrum: Sacral extension\par Lower Extremity: Right LE restricted in internal rotation \par \par \par

## 2022-01-04 NOTE — DATA REVIEWED
[FreeTextEntry1] :  MR Lumbar Spine No Cont Final\par \par  EXAM: MR SPINE LUMBAR\par \par \par PROCEDURE DATE: 10/23/2021\par \par \par \par INTERPRETATION: CLINICAL INDICATION: Right lower extremity radicular pain. Persistent lower back pain. Twisting injury. Low back muscle strain.\par \par Multiplanar Multisequence MR of the LUMBAR SPINE\par \par Prior Studies: None.\par \par FINDINGS:\par \par ALIGNMENT: There is slight rightward curvature of the mid lumbar spine. Lumbar lordosis is maintained. There is mild degenerative grade 1 anterolisthesis of L3 on L4.\par \par VERTEBRAL BODIES: No acute compression deformity.\par \par DISC SPACES: There is multilevel disc desiccation with preservation of disc height.\par \par MARROW: There is osseous edema involving the left greater the right L3/L4 facets bilaterally suggestive of osseous stress reaction. Patient is status post left hip arthroplasty. There is evidence of right hip arthrosis.\par \par SACROILIAC JOINTS: There is mild bilateral sacroiliac joint arthrosis.\par \par CONUS AND CAUDA EQUINA: Conus is normal in morphology terminating at the level of L1. There is Tarlov cyst formation at the level of S2 measuring 1.1 x 1.1 cm.\par \par IMAGED ABDOMINAL AND PELVIC STRUCTURES: Intact.\par \par The findings at the individual levels are as follows:\par \par T12/L1: There is no spinal canal or neural foraminal narrowing.\par \par L1/2: There is no spinal canal or neural foraminal narrowing.\par \par L2/3: There is a minimal disc bulge without spinal canal or neural foraminal narrowing.\par \par L3/4: There is uncovering the posterior disc with a mild diffuse disc bulge. There is bilateral facet arthrosis. There is a trace left facet joint effusion. There is mild to moderate left and mild right neural foraminal narrowing. There is mild spinal canal narrowing.\par \par L4/5: There is a diffuse disc bulge with a superimposed left paracentral disc protrusion. Findings result in mild bilateral lateral recess narrowing, left greater than right. There is mild central canal narrowing. There is mild to moderate right and mild left neural foraminal narrowing.\par \par L5/S1: There is a mild diffuse disc bulge bilateral facet arthrosis. There is a superimposed right foraminal disc protrusion. There is mild bilateral neural foraminal narrowing, right greater than left. There is no central canal narrowing.\par \par IMPRESSION:\par \par Multilevel lumbar spondylosis. Slight rightward curvature of the mid lumbar spine. Mild degenerative grade 1 anterolisthesis of L3 on L4.\par \par Osseous edema involving the left greater the right L3/L4 facets bilaterally suggestive of osseous stress reaction.\par \par L3/4: There is bilateral facet arthrosis. There is a trace left facet joint effusion. There is mild to moderate left and mild right neural foraminal narrowing. There is mild spinal canal narrowing.\par \par L4/5: There is a diffuse disc bulge with a superimposed left paracentral disc protrusion. There is mild bilateral lateral recess narrowing, left greater than right. There is mild central canal narrowing. There is mild to moderate right and mild left neural foraminal narrowing.\par \par L5/S1: There is a right foraminal disc protrusion. There is mild bilateral neural foraminal narrowing, right greater than left.\par

## 2022-01-04 NOTE — ASSESSMENT
[FreeTextEntry1] : 51 year old female presenting for evaluation.\par \par #Low back pain:\par -Associated with a myofascial component\par -Pain medicine and lumbar spine MRI reviewed.\par -Discussed risks and benefits of OMT.\par -Osteopathic structural exam demonstrated somatic dysfunction and the patient agreed to osteopathic manipulation.\par  \par \par 1. Somatic dysfunction rib\par --OMT performed with muscle energy\par 2. Somatic dysfunction lumbar \par --OMT performed with counterstrain\par 3. Somatic dysfunction pelvis\par --OMT performed with myofascial release\par 4. Somatic dysfunction sacrum\par --OMT performed with myofascial release\par 5. Somatic dysfunction lower extremity \par --OMT performed with myofascial release\par \par Patient tolerated treatment well.\par \par Follow up in 2 weeks.  \par

## 2022-01-20 ENCOUNTER — APPOINTMENT (OUTPATIENT)
Dept: PHYSICAL MEDICINE AND REHAB | Facility: CLINIC | Age: 52
End: 2022-01-20
Payer: COMMERCIAL

## 2022-01-20 DIAGNOSIS — M99.05 SEGMENTAL AND SOMATIC DYSFUNCTION OF PELVIC REGION: ICD-10-CM

## 2022-01-20 DIAGNOSIS — M99.02 SEGMENTAL AND SOMATIC DYSFUNCTION OF THORACIC REGION: ICD-10-CM

## 2022-01-20 DIAGNOSIS — M99.08 SEGMENTAL AND SOMATIC DYSFUNCTION OF RIB CAGE: ICD-10-CM

## 2022-01-20 DIAGNOSIS — M99.01 SEGMENTAL AND SOMATIC DYSFUNCTION OF CERVICAL REGION: ICD-10-CM

## 2022-01-20 DIAGNOSIS — M99.07 SEGMENTAL AND SOMATIC DYSFUNCTION OF UPPER EXTREMITY: ICD-10-CM

## 2022-01-20 DIAGNOSIS — M79.18 MYALGIA, OTHER SITE: ICD-10-CM

## 2022-01-20 DIAGNOSIS — M99.04 SEGMENTAL AND SOMATIC DYSFUNCTION OF SACRAL REGION: ICD-10-CM

## 2022-01-20 DIAGNOSIS — M99.03 SEGMENTAL AND SOMATIC DYSFUNCTION OF LUMBAR REGION: ICD-10-CM

## 2022-01-20 DIAGNOSIS — M99.00 SEGMENTAL AND SOMATIC DYSFUNCTION OF HEAD REGION: ICD-10-CM

## 2022-01-20 DIAGNOSIS — M99.06 SEGMENTAL AND SOMATIC DYSFUNCTION OF LOWER EXTREMITY: ICD-10-CM

## 2022-01-20 PROCEDURE — 98929 OSTEOPATH MANJ 9-10 REGIONS: CPT

## 2022-01-20 NOTE — HISTORY OF PRESENT ILLNESS
[FreeTextEntry1] : Ms. Calderon is a 51 year old female with history of connective tissue disorder.  She reports she has had chronic low back pain. She describes the pain is worse on the right lower back especially in the quadratus lumborum. She has been performing physical therapy which she thinks helps. Prolonged sitting makes it worse. She describes as a chronic ache. She denies any radiation pain down her legs. She denies any bowel or bladder dysfunction. She denies any overt weakness.     \par \par Past surgical history: Left hip replacement, Right humerus fracture repair, left elbow surgery \par \par Interval history:\par She notes improvement in her symptoms since last visit.  She reports that OMT did help her with her low back tightness and pain region.  She reports she was able to try running but she thinks this may have exacerbated her symptoms.  Worst pain still in the right low back region.  She also reports tension in her bilateral periscapular region as well as neck.  She denies any new weakness radiation of pain numbness tingling or bowel bladder dysfunction.

## 2022-01-20 NOTE — PHYSICAL EXAM
[FreeTextEntry1] : Gen: Patient is A&O x 3, NAD\par HEENT: EOMI, hearing grossly normal\par Resp: regular, non - labored\par CV: pulses regular\par Skin: no rashes, erythema\par Lymph: no clubbing, cyanosis, edema, or palpable lymphadenopathy\par Inspection: no instability or misalignment\par ROM: full throughout\par Palpation: TTP right QL, TTP Right PSIS, TTP bilateral trapezius, TTP right cervical paraspinals \par Sensation: intact to light touch\par Reflexes: 1+ and symmetric throughout\par Strength: 5/5 throughout\par Special tests: -straight leg raise, -Corona's sign \par Gait: normal, non-antalgic\par \par Osteopathic Structural Exam:\par \par Rib:Right ribs 11-12 posterior \par Lumbar: Right L4, L5 posterior tenderpoints\par Pelvis: Right medial innominate\par Sacrum: Sacral extension\par Lower Extremity: Right LE restricted in internal rotation \par Cranial: OA rotated right\par Cervical: Right C4 tenderpoint \par Thoracic: T1-T3 NRLSBR\par Upper extremity: Bilateral trapezius tenderpoint \par \par \par \par \par

## 2022-01-20 NOTE — ASSESSMENT
[FreeTextEntry1] : 51 year old female presenting for evaluation.\par \par #Low back pain/Neck pain/Myofascial pain:\par -Improved since last visit with OMT\par -Discussed risks and benefits of OMT.\par -Osteopathic structural exam demonstrated somatic dysfunction and the patient agreed to osteopathic manipulation.\par  \par \par 1. Somatic dysfunction rib\par --OMT performed with muscle energy\par 2. Somatic dysfunction lumbar \par --OMT performed with counterstrain\par 3. Somatic dysfunction pelvis\par --OMT performed with myofascial release\par 4. Somatic dysfunction sacrum\par --OMT performed with myofascial release\par 5. Somatic dysfunction lower extremity \par --OMT performed with myofascial release\par 6. Somatic dysfunction cranial\par -OMT performed with myofascial release \par 7. Somatic dysfunction cervical\par --OMT performed with counterstrain\par 8. Somatic dysfunction upper extremity\par --OMT performed with counterstrain \par 9. Somatic dysfunction thoracic \par --OMT performed with myofascial release \par \par \par \par Patient tolerated treatment well.\par \par Follow up in 2-4 weeks.  \par

## 2022-02-10 ENCOUNTER — APPOINTMENT (OUTPATIENT)
Dept: PHYSICAL MEDICINE AND REHAB | Facility: CLINIC | Age: 52
End: 2022-02-10

## 2022-02-14 ENCOUNTER — APPOINTMENT (OUTPATIENT)
Dept: OBGYN | Facility: CLINIC | Age: 52
End: 2022-02-14

## 2022-02-16 ENCOUNTER — APPOINTMENT (OUTPATIENT)
Dept: PHYSICAL MEDICINE AND REHAB | Facility: CLINIC | Age: 52
End: 2022-02-16
Payer: COMMERCIAL

## 2022-02-16 VITALS
HEIGHT: 65 IN | SYSTOLIC BLOOD PRESSURE: 128 MMHG | RESPIRATION RATE: 12 BRPM | DIASTOLIC BLOOD PRESSURE: 84 MMHG | HEART RATE: 86 BPM | WEIGHT: 155 LBS | BODY MASS INDEX: 25.83 KG/M2

## 2022-02-16 DIAGNOSIS — M25.512 PAIN IN LEFT SHOULDER: ICD-10-CM

## 2022-02-16 DIAGNOSIS — Z91.81 HISTORY OF FALLING: ICD-10-CM

## 2022-02-16 PROCEDURE — 99213 OFFICE O/P EST LOW 20 MIN: CPT

## 2022-02-16 NOTE — ASSESSMENT
[FreeTextEntry1] : Ms. BARRY SILVA is a 52 year old female who presents with L shoulder pain following a fall onto the ice two days ago. Active ROM limited by pain with tenderness over the bicipital groove. Differential includes bicipital tendonitis vs. impingement syndrome.\par -MRI L Shoulder\par -Rest/Ice/Compression\par -Tylenol and NSAIDs or voltaren gel PRN\par -RTC following MRI. Patient in agreement with plan. \par

## 2022-02-16 NOTE — PHYSICAL EXAM
[FreeTextEntry1] : PE:\par Constitutional: In NAD, calm and cooperative\par MSK (Shoulder):\par                 Inspection: no gross swelling identified\par                 Reflexes: 2+ Biceps/Brachioradialis/patella/Achilles reflex bilaterally, Corona’s/Clonus negative bilaterally\par                 Sensation: Intact to light touch in bilateral upper extremities\par                 ROM: active flexion to 180 with pain and active ER/IR limited by pain at anterior aspect of shoulder\par                 Palpation: TTP at bicipital groove and coracoid process\par                 Special tests: Kumar positive, neer's positive, yergason's negative, empty can test positive\par

## 2022-02-16 NOTE — HISTORY OF PRESENT ILLNESS
[FreeTextEntry1] : Ms. BARRY SILVA is a 52 year old  female who presents for follow up. Patient was previously treated for low back pain but now complains of L shoulder/upper back pain. She fell on the ice this Monday, landing on her L side on her shoulder. She remained active afterwards, though began to develop pain in the neck and L shoulder later that day after going to the gym. \par \par Location: L neck/shoulder\par Onset: 2 days ago after falling on the ice\par Provocation/Palliative: pain worse with movement at shoulder including abduction. Slightly improved with motrin\par Quality: sore/throbbing at rest with occ sharp pain with movement\par Radiation: radiates to bicep\par Severity: 6/10\par Timing: constant, worse with certain activities\par \par Denies any associated numbness. Denies any associated arm or hand weakness. Denies any loss of bowel/bladder control or any groin numbness.\par Previous medications trialed: Motrin with some relief\par Previous procedures relevant to complaint: None\par Has tried conservative treatment?: None\par  \par No bowel/bladder changes. No groin numbness.

## 2022-02-17 ENCOUNTER — APPOINTMENT (OUTPATIENT)
Dept: MRI IMAGING | Facility: CLINIC | Age: 52
End: 2022-02-17
Payer: COMMERCIAL

## 2022-02-17 ENCOUNTER — OUTPATIENT (OUTPATIENT)
Dept: OUTPATIENT SERVICES | Facility: HOSPITAL | Age: 52
LOS: 1 days | End: 2022-02-17
Payer: COMMERCIAL

## 2022-02-17 DIAGNOSIS — S42.402A UNSPECIFIED FRACTURE OF LOWER END OF LEFT HUMERUS, INITIAL ENCOUNTER FOR CLOSED FRACTURE: Chronic | ICD-10-CM

## 2022-02-17 DIAGNOSIS — Z96.7 PRESENCE OF OTHER BONE AND TENDON IMPLANTS: Chronic | ICD-10-CM

## 2022-02-17 DIAGNOSIS — M25.512 PAIN IN LEFT SHOULDER: ICD-10-CM

## 2022-02-17 DIAGNOSIS — Z91.81 HISTORY OF FALLING: ICD-10-CM

## 2022-02-17 DIAGNOSIS — Z98.89 OTHER SPECIFIED POSTPROCEDURAL STATES: Chronic | ICD-10-CM

## 2022-02-17 PROCEDURE — 73221 MRI JOINT UPR EXTREM W/O DYE: CPT

## 2022-02-17 PROCEDURE — 73221 MRI JOINT UPR EXTREM W/O DYE: CPT | Mod: 26,LT

## 2022-02-22 ENCOUNTER — NON-APPOINTMENT (OUTPATIENT)
Age: 52
End: 2022-02-22

## 2022-05-12 ENCOUNTER — APPOINTMENT (OUTPATIENT)
Dept: RHEUMATOLOGY | Facility: CLINIC | Age: 52
End: 2022-05-12
Payer: COMMERCIAL

## 2022-05-12 DIAGNOSIS — Z11.59 ENCOUNTER FOR SCREENING FOR OTHER VIRAL DISEASES: ICD-10-CM

## 2022-05-12 DIAGNOSIS — M35.9 SYSTEMIC INVOLVEMENT OF CONNECTIVE TISSUE, UNSPECIFIED: ICD-10-CM

## 2022-05-12 PROCEDURE — 99215 OFFICE O/P EST HI 40 MIN: CPT | Mod: 95

## 2022-05-12 NOTE — ASSESSMENT
[FreeTextEntry1] : 52 yo PMHX Graves, OA s/p left THR, chronic hives here to transfer care \par \par multiple issues to discuss today \par \par \par #was told leaky gut by integrative medicine \par -- feels better on fasting 5 days with food every month with a kit called prolong (soups, kale, etc)\par -- check celiac ab \par -- started med/probiotics that are supposed to heal the intestinal wall\par \par #knee pain, possible lyme exposure\par -- check lyme\par \par #r/o CTD\par intermittent photosensitive rashes, polyarthralgias and h/o positive DOROTHEA 1:1280.  inflammatory markers were negative, and serologies only positive for DOROTHEA.  no more sweling of joint after chaning diet and cleaning of gut.   feels great on this.  doesn’t meet criteria for SLE at this point.  ? if the wbc low is related or not \par -- repeat CBC\par -- repeat protein:creatine ratio\par -- previously had increased LFT - though has been perfect lately - will send ASMA, AMA \par -- t/c HCQ if persistent - low threshold (previously on it for only a few weeks, but stopped it after broke arm)\par \par #proteinuria \par --  repeat and confirm\par -- check aic\par \par #leukopenia\par patient to mail me the results and repeat\par \par #urticaria \par chronic and pre-dating the rashes.   could be 2/2 RASHAUN/thyroid \par -- check urticarial panel for autoimmune hives - reviewed and previously negative\par \par #OA\par early significant oa at the hips, cmc jonits- but no identifiable RF\par -- continue ortho care\par \par \par More than 50% of the encounter was spent counseling the patient on differential, workup, disease course and treatment/management.  Education was provided to the patient during this encounter.  All questions and concerns were addressed and answered.   The patient verbalized understanding and agreed to the plan. \par \par Patient has been instructed to call for an appointment if new symptoms develop.\par Patient has been instructed to make a followup appointment in 12 months.\par \par Time spent on the encounter included, but is not limited to, preparing to see the patient, obtaining and/or reviewing separately obtained history, performing the evaluation, counseling and educating, independently interpreting results with communication to patient, order placement, referring and/or communicating with other health professionals as described, and documenting clinical information in the electronic health record\par \par \par More than 50% of the encounter was spent counseling the patient on differential, workup, disease course and treatment/management.  Education was provided to the patient during this encounter.  All questions and concerns were addressed and answered.   The patient verbalized understanding and agreed to the plan. \par \par Patient has been instructed to call for an appointment if new symptoms develop.\par Patient has been instructed to make a followup appointment in 1 months.\par

## 2022-05-12 NOTE — HISTORY OF PRESENT ILLNESS
[FreeTextEntry1] : INTERVAL HX \par has a sore throat - developed a hoarse voice \par - started with sinus congestion \par - usually gets allergic sinus issues \par - denies fever - body aches - no constitutional sxs\par - had covid and presented as an ear infection - January 2022 as well as [place] of 2020\par \par in terms of f/u symptoms \par - works out every day - lightly movement \par - adjusting diet to low sugar\par - knees do hurt a bit \par - has had some fatigue issues \par - dog had lyme disease - doesn’t remember a tick bit - but is with the dog and has taken ticks off her\par - lives in [place]\par - had hives and facial swelling sporadically during covid - never found out why.  was supposed to see an immunologist but never did.\par - has been seeing integrative consult - was told a leaky gut - which she feels could be related o to the hives.  as soon as she witched the diet the hives resolved.  has had intermittent hives through her whole life. \par \par Rheum ROS \par - denies RP, sicca, oral ulcers, rashes, photosensitivity.   \par - denies constitutional symptoms, fatigue, night sweats.  weight is stable \par - Denies psoriasis, IBD, Inflammatory eye disease, STD, infectious diarrhea\par - breathes well without h/o of pleuritis, pericarditis.  renal function is normal and urine is not frothy\par - muscles are strong and there is no neurologic issues [Home] : at home, [unfilled] , at the time of the visit. [Other Location: e.g. Home (Enter Location, City,State)___] : at [unfilled] [Verbal consent obtained from patient] : the patient, [unfilled] [Anorexia] : no anorexia [Weight Loss] : no weight loss [Malaise] : no malaise [Fever] : no fever [Chills] : no chills [Fatigue] : no fatigue [Depression] : no depression [Malar Facial Rash] : no malar facial rash [Skin Lesions] : no lesions [Skin Nodules] : no skin nodules [Dry Mouth] : no dry mouth [Shortness of Breath] : no shortness of breath [Chest Pain] : no chest pain [Arthralgias] : arthralgias [Difficulty Standing] : no difficulty standing [Muscle Weakness] : no muscle weakness [Eye Pain] : no eye pain [Eye Redness] : no eye redness

## 2022-10-13 PROBLEM — M99.00 CRANIAL SOMATIC DYSFUNCTION: Status: ACTIVE | Noted: 2022-01-20

## 2023-12-01 ENCOUNTER — APPOINTMENT (OUTPATIENT)
Dept: MAMMOGRAPHY | Facility: CLINIC | Age: 53
End: 2023-12-01
Payer: COMMERCIAL

## 2023-12-01 ENCOUNTER — OUTPATIENT (OUTPATIENT)
Dept: OUTPATIENT SERVICES | Facility: HOSPITAL | Age: 53
LOS: 1 days | End: 2023-12-01
Payer: COMMERCIAL

## 2023-12-01 ENCOUNTER — APPOINTMENT (OUTPATIENT)
Dept: RADIOLOGY | Facility: CLINIC | Age: 53
End: 2023-12-01
Payer: COMMERCIAL

## 2023-12-01 DIAGNOSIS — Z96.7 PRESENCE OF OTHER BONE AND TENDON IMPLANTS: Chronic | ICD-10-CM

## 2023-12-01 DIAGNOSIS — Z00.8 ENCOUNTER FOR OTHER GENERAL EXAMINATION: ICD-10-CM

## 2023-12-01 DIAGNOSIS — Z98.89 OTHER SPECIFIED POSTPROCEDURAL STATES: Chronic | ICD-10-CM

## 2023-12-01 DIAGNOSIS — S42.402A UNSPECIFIED FRACTURE OF LOWER END OF LEFT HUMERUS, INITIAL ENCOUNTER FOR CLOSED FRACTURE: Chronic | ICD-10-CM

## 2023-12-01 PROCEDURE — 77080 DXA BONE DENSITY AXIAL: CPT | Mod: 26

## 2023-12-01 PROCEDURE — 77067 SCR MAMMO BI INCL CAD: CPT | Mod: 26

## 2023-12-01 PROCEDURE — 77067 SCR MAMMO BI INCL CAD: CPT

## 2023-12-01 PROCEDURE — 77063 BREAST TOMOSYNTHESIS BI: CPT | Mod: 26

## 2023-12-01 PROCEDURE — 77063 BREAST TOMOSYNTHESIS BI: CPT

## 2023-12-01 PROCEDURE — 77080 DXA BONE DENSITY AXIAL: CPT

## 2023-12-12 ENCOUNTER — APPOINTMENT (OUTPATIENT)
Dept: ULTRASOUND IMAGING | Facility: CLINIC | Age: 53
End: 2023-12-12
Payer: COMMERCIAL

## 2023-12-12 ENCOUNTER — OUTPATIENT (OUTPATIENT)
Dept: OUTPATIENT SERVICES | Facility: HOSPITAL | Age: 53
LOS: 1 days | End: 2023-12-12
Payer: COMMERCIAL

## 2023-12-12 DIAGNOSIS — Z96.7 PRESENCE OF OTHER BONE AND TENDON IMPLANTS: Chronic | ICD-10-CM

## 2023-12-12 DIAGNOSIS — E89.0 POSTPROCEDURAL HYPOTHYROIDISM: ICD-10-CM

## 2023-12-12 DIAGNOSIS — S42.402A UNSPECIFIED FRACTURE OF LOWER END OF LEFT HUMERUS, INITIAL ENCOUNTER FOR CLOSED FRACTURE: Chronic | ICD-10-CM

## 2023-12-12 DIAGNOSIS — Z98.89 OTHER SPECIFIED POSTPROCEDURAL STATES: Chronic | ICD-10-CM

## 2023-12-12 PROCEDURE — 76641 ULTRASOUND BREAST COMPLETE: CPT

## 2023-12-12 PROCEDURE — 76641 ULTRASOUND BREAST COMPLETE: CPT | Mod: 26,50

## 2023-12-12 PROCEDURE — 76536 US EXAM OF HEAD AND NECK: CPT | Mod: 26

## 2023-12-12 PROCEDURE — 76536 US EXAM OF HEAD AND NECK: CPT

## 2024-11-04 NOTE — PATIENT PROFILE ADULT. - HEALTH/HEALTHCARE ANXIETIES, PROFILE
Problem: Discharge Planning  Goal: Discharge to home or other facility with appropriate resources  Outcome: Progressing  Flowsheets (Taken 11/4/2024 1140)  Discharge to home or other facility with appropriate resources: Identify barriers to discharge with patient and caregiver     Problem: Safety - Adult  Goal: Free from fall injury  Outcome: Progressing      none

## 2024-12-26 ENCOUNTER — APPOINTMENT (OUTPATIENT)
Dept: GASTROENTEROLOGY | Facility: CLINIC | Age: 54
End: 2024-12-26